# Patient Record
Sex: FEMALE | Race: WHITE | NOT HISPANIC OR LATINO | Employment: OTHER | URBAN - METROPOLITAN AREA
[De-identification: names, ages, dates, MRNs, and addresses within clinical notes are randomized per-mention and may not be internally consistent; named-entity substitution may affect disease eponyms.]

---

## 2017-04-10 ENCOUNTER — APPOINTMENT (OUTPATIENT)
Dept: WOMENS IMAGING | Facility: HOSPITAL | Age: 82
End: 2017-04-10

## 2017-04-10 PROCEDURE — MDREVIEWSP: Performed by: RADIOLOGY

## 2017-04-10 PROCEDURE — G0206 DX MAMMO INCL CAD UNI: HCPCS | Performed by: RADIOLOGY

## 2017-10-05 ENCOUNTER — APPOINTMENT (OUTPATIENT)
Dept: WOMENS IMAGING | Facility: HOSPITAL | Age: 82
End: 2017-10-05

## 2017-10-05 PROCEDURE — G0204 DX MAMMO INCL CAD BI: HCPCS | Performed by: RADIOLOGY

## 2017-10-05 PROCEDURE — G0279 TOMOSYNTHESIS, MAMMO: HCPCS | Performed by: RADIOLOGY

## 2017-10-05 PROCEDURE — 76641 ULTRASOUND BREAST COMPLETE: CPT | Performed by: RADIOLOGY

## 2017-10-17 ENCOUNTER — APPOINTMENT (OUTPATIENT)
Dept: WOMENS IMAGING | Facility: HOSPITAL | Age: 82
End: 2017-10-17

## 2017-10-17 PROCEDURE — 76942 ECHO GUIDE FOR BIOPSY: CPT | Performed by: RADIOLOGY

## 2017-10-17 PROCEDURE — 19083 BX BREAST 1ST LESION US IMAG: CPT | Performed by: RADIOLOGY

## 2017-10-17 PROCEDURE — 19000 PUNCTURE ASPIR CYST BREAST: CPT | Performed by: RADIOLOGY

## 2018-04-27 ENCOUNTER — APPOINTMENT (OUTPATIENT)
Dept: CT IMAGING | Facility: HOSPITAL | Age: 83
End: 2018-04-27

## 2018-04-27 ENCOUNTER — APPOINTMENT (OUTPATIENT)
Dept: GENERAL RADIOLOGY | Facility: HOSPITAL | Age: 83
End: 2018-04-27

## 2018-04-27 ENCOUNTER — HOSPITAL ENCOUNTER (EMERGENCY)
Facility: HOSPITAL | Age: 83
Discharge: HOME | End: 2018-04-27
Attending: FAMILY MEDICINE | Admitting: FAMILY MEDICINE

## 2018-04-27 VITALS
HEIGHT: 70 IN | RESPIRATION RATE: 16 BRPM | OXYGEN SATURATION: 96 % | BODY MASS INDEX: 25.05 KG/M2 | WEIGHT: 175 LBS | HEART RATE: 75 BPM | DIASTOLIC BLOOD PRESSURE: 93 MMHG | TEMPERATURE: 97.4 F | SYSTOLIC BLOOD PRESSURE: 146 MMHG

## 2018-04-27 DIAGNOSIS — R29.6 FREQUENT FALLS: Primary | ICD-10-CM

## 2018-04-27 DIAGNOSIS — S30.0XXA SACRAL CONTUSION, INITIAL ENCOUNTER: ICD-10-CM

## 2018-04-27 DIAGNOSIS — E86.0 MILD DEHYDRATION: ICD-10-CM

## 2018-04-27 DIAGNOSIS — S09.90XA INJURY OF HEAD, INITIAL ENCOUNTER: ICD-10-CM

## 2018-04-27 LAB
ALBUMIN SERPL-MCNC: 3.7 G/DL (ref 3.5–5.2)
ALBUMIN/GLOB SERPL: 1.3 G/DL
ALP SERPL-CCNC: 88 U/L (ref 39–117)
ALT SERPL W P-5'-P-CCNC: 12 U/L (ref 1–33)
ANION GAP SERPL CALCULATED.3IONS-SCNC: 8.6 MMOL/L
AST SERPL-CCNC: 13 U/L (ref 1–32)
BACTERIA UR QL AUTO: ABNORMAL /HPF
BASOPHILS # BLD AUTO: 0.04 10*3/MM3 (ref 0–0.2)
BASOPHILS NFR BLD AUTO: 0.5 % (ref 0–1.5)
BILIRUB SERPL-MCNC: 0.6 MG/DL (ref 0.1–1.2)
BILIRUB UR QL STRIP: NEGATIVE
BUN BLD-MCNC: 19 MG/DL (ref 8–23)
BUN/CREAT SERPL: 15.6 (ref 7–25)
CALCIUM SPEC-SCNC: 9 MG/DL (ref 8.6–10.5)
CHLORIDE SERPL-SCNC: 104 MMOL/L (ref 98–107)
CLARITY UR: CLEAR
CO2 SERPL-SCNC: 28.4 MMOL/L (ref 22–29)
COLOR UR: YELLOW
CREAT BLD-MCNC: 1.22 MG/DL (ref 0.57–1)
DEPRECATED RDW RBC AUTO: 50.2 FL (ref 37–54)
EOSINOPHIL # BLD AUTO: 0.18 10*3/MM3 (ref 0–0.7)
EOSINOPHIL NFR BLD AUTO: 2.3 % (ref 0.3–6.2)
ERYTHROCYTE [DISTWIDTH] IN BLOOD BY AUTOMATED COUNT: 14.2 % (ref 11.7–13)
GFR SERPL CREATININE-BSD FRML MDRD: 42 ML/MIN/1.73
GLOBULIN UR ELPH-MCNC: 2.9 GM/DL
GLUCOSE BLD-MCNC: 96 MG/DL (ref 65–99)
GLUCOSE UR STRIP-MCNC: NEGATIVE MG/DL
HCT VFR BLD AUTO: 45.3 % (ref 35.6–45.5)
HGB BLD-MCNC: 14.8 G/DL (ref 11.9–15.5)
HGB UR QL STRIP.AUTO: ABNORMAL
HOLD SPECIMEN: NORMAL
HOLD SPECIMEN: NORMAL
HYALINE CASTS UR QL AUTO: ABNORMAL /LPF
IMM GRANULOCYTES # BLD: 0.02 10*3/MM3 (ref 0–0.03)
IMM GRANULOCYTES NFR BLD: 0.3 % (ref 0–0.5)
KETONES UR QL STRIP: NEGATIVE
LEUKOCYTE ESTERASE UR QL STRIP.AUTO: NEGATIVE
LYMPHOCYTES # BLD AUTO: 1.32 10*3/MM3 (ref 0.9–4.8)
LYMPHOCYTES NFR BLD AUTO: 16.7 % (ref 19.6–45.3)
MCH RBC QN AUTO: 31.8 PG (ref 26.9–32)
MCHC RBC AUTO-ENTMCNC: 32.7 G/DL (ref 32.4–36.3)
MCV RBC AUTO: 97.2 FL (ref 80.5–98.2)
MONOCYTES # BLD AUTO: 0.78 10*3/MM3 (ref 0.2–1.2)
MONOCYTES NFR BLD AUTO: 9.9 % (ref 5–12)
NEUTROPHILS # BLD AUTO: 5.59 10*3/MM3 (ref 1.9–8.1)
NEUTROPHILS NFR BLD AUTO: 70.6 % (ref 42.7–76)
NITRITE UR QL STRIP: NEGATIVE
NT-PROBNP SERPL-MCNC: 83.7 PG/ML (ref 0–1800)
PH UR STRIP.AUTO: 7 [PH] (ref 5–8)
PLATELET # BLD AUTO: 210 10*3/MM3 (ref 140–500)
PMV BLD AUTO: 12.3 FL (ref 6–12)
POTASSIUM BLD-SCNC: 4.1 MMOL/L (ref 3.5–5.2)
PROT SERPL-MCNC: 6.6 G/DL (ref 6–8.5)
PROT UR QL STRIP: NEGATIVE
RBC # BLD AUTO: 4.66 10*6/MM3 (ref 3.9–5.2)
RBC # UR: ABNORMAL /HPF
REF LAB TEST METHOD: ABNORMAL
SODIUM BLD-SCNC: 141 MMOL/L (ref 136–145)
SP GR UR STRIP: 1.02 (ref 1–1.03)
SQUAMOUS #/AREA URNS HPF: ABNORMAL /HPF
TROPONIN T SERPL-MCNC: <0.01 NG/ML (ref 0–0.03)
UROBILINOGEN UR QL STRIP: ABNORMAL
WBC NRBC COR # BLD: 7.91 10*3/MM3 (ref 4.5–10.7)
WBC UR QL AUTO: ABNORMAL /HPF
WHOLE BLOOD HOLD SPECIMEN: NORMAL
WHOLE BLOOD HOLD SPECIMEN: NORMAL

## 2018-04-27 PROCEDURE — 83880 ASSAY OF NATRIURETIC PEPTIDE: CPT | Performed by: PHYSICIAN ASSISTANT

## 2018-04-27 PROCEDURE — 96360 HYDRATION IV INFUSION INIT: CPT

## 2018-04-27 PROCEDURE — 70450 CT HEAD/BRAIN W/O DYE: CPT

## 2018-04-27 PROCEDURE — 72220 X-RAY EXAM SACRUM TAILBONE: CPT

## 2018-04-27 PROCEDURE — 84484 ASSAY OF TROPONIN QUANT: CPT | Performed by: PHYSICIAN ASSISTANT

## 2018-04-27 PROCEDURE — 93010 ELECTROCARDIOGRAM REPORT: CPT | Performed by: INTERNAL MEDICINE

## 2018-04-27 PROCEDURE — 81001 URINALYSIS AUTO W/SCOPE: CPT | Performed by: PHYSICIAN ASSISTANT

## 2018-04-27 PROCEDURE — 80053 COMPREHEN METABOLIC PANEL: CPT | Performed by: PHYSICIAN ASSISTANT

## 2018-04-27 PROCEDURE — 85025 COMPLETE CBC W/AUTO DIFF WBC: CPT | Performed by: PHYSICIAN ASSISTANT

## 2018-04-27 PROCEDURE — 71045 X-RAY EXAM CHEST 1 VIEW: CPT

## 2018-04-27 PROCEDURE — 99284 EMERGENCY DEPT VISIT MOD MDM: CPT

## 2018-04-27 PROCEDURE — 93005 ELECTROCARDIOGRAM TRACING: CPT | Performed by: PHYSICIAN ASSISTANT

## 2018-04-27 RX ORDER — ATORVASTATIN CALCIUM 10 MG/1
10 TABLET, FILM COATED ORAL DAILY
COMMUNITY

## 2018-04-27 RX ORDER — SODIUM CHLORIDE 0.9 % (FLUSH) 0.9 %
10 SYRINGE (ML) INJECTION AS NEEDED
Status: DISCONTINUED | OUTPATIENT
Start: 2018-04-27 | End: 2018-04-27 | Stop reason: HOSPADM

## 2018-04-27 RX ORDER — FUROSEMIDE 40 MG/1
40 TABLET ORAL DAILY
COMMUNITY

## 2018-04-27 RX ORDER — PAROXETINE HYDROCHLORIDE 20 MG/1
20 TABLET, FILM COATED ORAL EVERY MORNING
COMMUNITY

## 2018-04-27 RX ADMIN — SODIUM CHLORIDE 500 ML: 9 INJECTION, SOLUTION INTRAVENOUS at 18:47

## 2019-02-16 ENCOUNTER — APPOINTMENT (OUTPATIENT)
Dept: CT IMAGING | Facility: HOSPITAL | Age: 84
End: 2019-02-16

## 2019-02-16 ENCOUNTER — APPOINTMENT (OUTPATIENT)
Dept: GENERAL RADIOLOGY | Facility: HOSPITAL | Age: 84
End: 2019-02-16

## 2019-02-16 ENCOUNTER — HOSPITAL ENCOUNTER (EMERGENCY)
Facility: HOSPITAL | Age: 84
Discharge: HOME | End: 2019-02-16
Attending: EMERGENCY MEDICINE | Admitting: EMERGENCY MEDICINE

## 2019-02-16 VITALS
DIASTOLIC BLOOD PRESSURE: 83 MMHG | RESPIRATION RATE: 16 BRPM | BODY MASS INDEX: 26.66 KG/M2 | HEART RATE: 76 BPM | WEIGHT: 180 LBS | TEMPERATURE: 97.6 F | HEIGHT: 69 IN | OXYGEN SATURATION: 97 % | SYSTOLIC BLOOD PRESSURE: 139 MMHG
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
## 2019-02-16 DIAGNOSIS — G89.29 CHRONIC PAIN OF RIGHT KNEE: ICD-10-CM

## 2019-02-16 DIAGNOSIS — S00.03XA CONTUSION OF SCALP, INITIAL ENCOUNTER: Primary | ICD-10-CM

## 2019-02-16 DIAGNOSIS — M25.561 CHRONIC PAIN OF RIGHT KNEE: ICD-10-CM

## 2019-02-16 PROCEDURE — 73562 X-RAY EXAM OF KNEE 3: CPT

## 2019-02-16 PROCEDURE — 70450 CT HEAD/BRAIN W/O DYE: CPT

## 2019-02-16 PROCEDURE — 99283 EMERGENCY DEPT VISIT LOW MDM: CPT

## 2019-02-16 NOTE — ED PROVIDER NOTES
EMERGENCY DEPARTMENT ENCOUNTER    CHIEF COMPLAINT  Chief Complaint: Fall  History given by: Patient, EMS  History limited by: none   Room Number:   PMD: Atiya Miller MD      HPI:  Pt is a 89 y.o. female who presents to the ED via EMS complaining of unwitnessed mechanical fall onto buttocks that occurred at NH this morning. Per EMS, pt fell backwards and hit occiput as well, and pt confirms mild scalp pain. Pt confirms R knee pain due to hx of arthritis, but no other pain from fall. Pt denies LOC upon event, neck pain, HA, back pain, or any anticoagulation.     Duration:  Unspecified, PTA   Onset: sudden   Timin time event   Location: occiput   Radiation: none   Quality: pain  Intensity/Severity: mild   Progression: unchanged   Associated Symptoms: R knee pain   Aggravating Factors: fall   Alleviating Factors: none   Previous Episodes: none   Treatment before arrival: none    PAST MEDICAL HISTORY  Active Ambulatory Problems     Diagnosis Date Noted   • No Active Ambulatory Problems     Resolved Ambulatory Problems     Diagnosis Date Noted   • No Resolved Ambulatory Problems     Past Medical History:   Diagnosis Date   • Hyperlipidemia        PAST SURGICAL HISTORY  Past Surgical History:   Procedure Laterality Date   • APPENDECTOMY     • TONSILLECTOMY         FAMILY HISTORY  History reviewed. No pertinent family history.    SOCIAL HISTORY  Social History     Socioeconomic History   • Marital status: Single     Spouse name: Not on file   • Number of children: Not on file   • Years of education: Not on file   • Highest education level: Not on file   Social Needs   • Financial resource strain: Not on file   • Food insecurity - worry: Not on file   • Food insecurity - inability: Not on file   • Transportation needs - medical: Not on file   • Transportation needs - non-medical: Not on file   Occupational History   • Not on file   Tobacco Use   • Smoking status: Former Smoker   Substance and Sexual  Activity   • Alcohol use: Yes     Comment: occ   • Drug use: Not on file   • Sexual activity: Not on file   Other Topics Concern   • Not on file   Social History Narrative   • Not on file       ALLERGIES  Patient has no known allergies.    REVIEW OF SYSTEMS  Review of Systems   Constitutional: Negative for fever.   HENT: Negative for sore throat.    Eyes: Negative.    Respiratory: Negative for cough and shortness of breath.    Cardiovascular: Negative for chest pain.   Gastrointestinal: Negative for abdominal pain, diarrhea and vomiting.   Genitourinary: Negative for dysuria.   Musculoskeletal: Positive for arthralgias (R knee, scalp). Negative for back pain and neck pain.   Skin: Negative for rash.   Neurological: Negative for weakness, numbness and headaches.   Hematological: Negative.    Psychiatric/Behavioral: Negative.    All other systems reviewed and are negative.      PHYSICAL EXAM  ED Triage Vitals [02/16/19 1032]   Temp Heart Rate Resp BP SpO2   97.6 °F (36.4 °C) 76 16 154/62 97 %      Temp src Heart Rate Source Patient Position BP Location FiO2 (%)   Oral Monitor Sitting -- --       Physical Exam   Constitutional: She is oriented to person, place, and time and well-developed, well-nourished, and in no distress. No distress.   HENT:   Head: Normocephalic and atraumatic. Head is without contusion and without laceration.   Mouth/Throat: Mucous membranes are normal.   Eyes: EOM are normal.   Neck: Full passive range of motion without pain. No muscular tenderness present.   Cardiovascular: Normal rate and regular rhythm. Exam reveals no gallop and no friction rub.   No murmur heard.  Pulmonary/Chest: Effort normal. No respiratory distress. She has no wheezes. She has no rhonchi. She has no rales.   Breath sounds are symmetric.   Abdominal: Soft. She exhibits no distension. There is no tenderness. There is no guarding.   Musculoskeletal: Normal range of motion. She exhibits no deformity.        Right knee: She  exhibits normal range of motion, no effusion, no ecchymosis and no laceration.        Cervical back: She exhibits no tenderness and no bony tenderness.        Thoracic back: She exhibits no tenderness and no bony tenderness.        Lumbar back: She exhibits no tenderness and no bony tenderness.   Neurological: She is alert and oriented to person, place, and time.   moving all extremities, no focal deficits   Skin: Skin is warm and dry.   Psychiatric:   Calm, cooperative         RADIOLOGY  CT Head Without Contrast   Final Result           No acute intracranial hemorrhage or hydrocephalus. Chronic changes   of the brain. If there is further clinical concern, MRI could be   considered for further evaluation.       This report was finalized on 2/16/2019 11:32 AM by Dr. Ashwin Lopez M.D.          XR Knee 3 View Right   Final Result       No acute fracture is identified. Degenerative changes. Follow-up/further   evaluation can be obtained as indicated.               This report was finalized on 2/16/2019 11:14 AM by Dr. Ashwin Lopez M.D.               I ordered the above noted radiological studies. Interpreted by radiologist. Reviewed by me in PACS.     Procedures      PROGRESS AND CONSULTS     1035: CT head ordered due to hitting head upon fall    1040: XR R knee ordered for further evaluation.     1141: Pt rechecked and resting comfortably. Discussed negative acute findings of CT head with evidence of degenerative changes on XR knee. Informed pt of plan to discharge to NH facility, and pt directed to discuss knee pain with PCP. Pt understands and agrees with plan, all questions addressed.      MEDICAL DECISION MAKING  Results were reviewed/discussed with the patient and they were also made aware of online access. Pt also made aware that some labs, such as cultures, will not be resulted during ER visit and follow up with PMD is necessary.     MDM  Number of Diagnoses or Management Options  Chronic pain of  right knee:   Contusion of scalp, initial encounter:   Fall, initial encounter:   Diagnosis management comments: Patient without acute findings on imaging. Follow up with PCP and return to the ED for worsening symptoms as needed.        Amount and/or Complexity of Data Reviewed  Tests in the radiology section of CPT®: ordered and reviewed (CT head - no acute intracranial hemorrhage  XR - degenerative changes )           DIAGNOSIS  Final diagnoses:   Contusion of scalp, initial encounter   Chronic pain of right knee   Fall, initial encounter       DISPOSITION  DISCHARGE    Patient discharged in stable condition.    Reviewed implications of results, diagnosis, meds, responsibility to follow up, warning signs and symptoms of possible worsening, potential complications and reasons to return to ER.    Patient/Family voiced understanding of above instructions.    Discussed plan for discharge, as there is no emergent indication for admission. Patient referred to primary care provider for BP management due to today's BP. Pt/family is agreeable and understands need for follow up and repeat testing.  Pt is aware that discharge does not mean that nothing is wrong but it indicates no emergency is present that requires admission and they must continue care with follow-up as given below or physician of their choice.     FOLLOW-UP  Atiya Miller MD  [address] [place] [address]  [address]  [place] [address]  [phone]    Schedule an appointment as soon as possible for a visit       [facility] Emergency Department  [address] [facility] [address] [phone]    As needed, If symptoms worsen         Medication List      No changes were made to your prescriptions during this visit.           Latest Documented Vital Signs:  As of 11:44 AM  BP- 154/62 HR- 76 Temp- 97.6 °F (36.4 °C) (Oral) O2 sat- 97%    --  Documentation assistance provided by zachary Dale for Dr. Tan.  Information  recorded by the scribe was done at my direction and has been verified and validated by me.            Tuyet Dale  02/16/19 1144       Jamil Tan MD  02/16/19 6581

## 2019-02-16 NOTE — ED NOTES
"Spoke with son Lee at 1208, instructed him that his mother was going to be discharged.  Son verbalized understanding and said he would be by hospital to get his mother to take her back to [place].  Patient is alert and oriented x4, went over discharge, for which patient verbalized understanding.  Patient asked if it was okay to take her out to waiting room.  Patient states, \"as long as I can watch the game until my son comes to get me.\"  Patient escorted out in wheelchair and placed in front of TV, with view of ER entrance.  Triage staff instructed that patient would need help out when son arrived, verbalized understanding.     Matt Nickerson RN  02/16/19 1219    "

## 2019-02-22 ENCOUNTER — APPOINTMENT (OUTPATIENT)
Dept: CT IMAGING | Facility: HOSPITAL | Age: 84
End: 2019-02-22

## 2019-02-22 ENCOUNTER — HOSPITAL ENCOUNTER (EMERGENCY)
Facility: HOSPITAL | Age: 84
Discharge: HOME | End: 2019-02-23
Attending: EMERGENCY MEDICINE | Admitting: EMERGENCY MEDICINE

## 2019-02-22 VITALS
HEIGHT: 69 IN | DIASTOLIC BLOOD PRESSURE: 68 MMHG | TEMPERATURE: 98.3 F | HEART RATE: 70 BPM | OXYGEN SATURATION: 99 % | SYSTOLIC BLOOD PRESSURE: 132 MMHG | RESPIRATION RATE: 16 BRPM | BODY MASS INDEX: 28.24 KG/M2 | WEIGHT: 190.7 LBS
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
## 2019-02-22 DIAGNOSIS — S09.90XA MINOR HEAD INJURY WITHOUT LOSS OF CONSCIOUSNESS, INITIAL ENCOUNTER: Primary | ICD-10-CM

## 2019-02-22 PROCEDURE — 99284 EMERGENCY DEPT VISIT MOD MDM: CPT

## 2019-02-22 PROCEDURE — 70450 CT HEAD/BRAIN W/O DYE: CPT

## 2019-02-23 ENCOUNTER — HOSPITAL ENCOUNTER (EMERGENCY)
Facility: HOSPITAL | Age: 84
Discharge: STILL ADMITTED OR ADMINISTRATIVE | End: 2019-02-23

## 2019-02-23 NOTE — ED PROVIDER NOTES
Pt is a 89 y.o. female who presents to the ED complaining of a posterior scalp hematoma that she sustained after a fall this evening.       On exam,  Constitutional: NAD  HENT: There is a R occipital scalp hematoma  Musculoskeletal: No C-spine tenderness      Imaging reviewed.     Plan: Discharge      MD ATTESTATION NOTE    The GENOVEVA and I have discussed this patient's history, physical exam, and treatment plan.  I have reviewed the documentation and personally had a face to face interaction with the patient. I affirm the documentation and agree with the treatment and plan.  The attached note describes my personal findings.      Documentation assistance provided by zachary Frost for Dr. Levy. Information recorded by the scribe was done at my direction and has been verified and validated by me.             Bret Frost  02/22/19 7912       Jimbo Levy MD  02/22/19 9274

## 2019-02-23 NOTE — ED PROVIDER NOTES
" EMERGENCY DEPARTMENT ENCOUNTER    CHIEF COMPLAINT  Chief Complaint: head injury  History given by: patient  History limited by: nothing  Room Number: 28/28  PMD: Atiya Miller MD      HPI:  Pt is a 89 y.o. female who presents complaining of posterior head injury s/p fall backwards just PTA. Pt states that she lost her balance and fell backwards. Pt denies LOC, HA, neck pain, back pain, N/V or dizziness. Pt states that \"the only reason that I'm here is because my doctor told me that I needed to get checked any time that I hit my head.\" Pt states that she has a history of frequent falls    Duration:  PTA  Onset: sudden  Timing: constant  Location: posterior head  Radiation: none  Quality: unspecified  Intensity/Severity: moderate  Progression: unchanged  Associated Symptoms: none  Aggravating Factors: none  Alleviating Factors: none  Previous Episodes: Pt states that she has a history of frequent falls  Treatment before arrival: none    PAST MEDICAL HISTORY  Active Ambulatory Problems     Diagnosis Date Noted   • No Active Ambulatory Problems     Resolved Ambulatory Problems     Diagnosis Date Noted   • No Resolved Ambulatory Problems     Past Medical History:   Diagnosis Date   • Hyperlipidemia        PAST SURGICAL HISTORY  Past Surgical History:   Procedure Laterality Date   • APPENDECTOMY     • TONSILLECTOMY         FAMILY HISTORY  No family history on file.    SOCIAL HISTORY  Social History     Socioeconomic History   • Marital status: Single     Spouse name: Not on file   • Number of children: Not on file   • Years of education: Not on file   • Highest education level: Not on file   Social Needs   • Financial resource strain: Not on file   • Food insecurity - worry: Not on file   • Food insecurity - inability: Not on file   • Transportation needs - medical: Not on file   • Transportation needs - non-medical: Not on file   Occupational History   • Not on file   Tobacco Use   • Smoking status: Former " Smoker   Substance and Sexual Activity   • Alcohol use: Yes     Comment: occ   • Drug use: Not on file   • Sexual activity: Not on file   Other Topics Concern   • Not on file   Social History Narrative   • Not on file       ALLERGIES  Patient has no known allergies.    REVIEW OF SYSTEMS  Review of Systems   Constitutional: Negative for fever.   HENT: Negative for sore throat.    Eyes: Negative.    Respiratory: Negative for cough and shortness of breath.    Cardiovascular: Negative for chest pain.   Gastrointestinal: Negative for abdominal pain, diarrhea and vomiting.   Genitourinary: Negative for dysuria.   Musculoskeletal: Negative for neck pain.   Skin: Negative for rash.        (+) Scalp contusion   Neurological: Negative for weakness, numbness and headaches.   Hematological: Negative.    Psychiatric/Behavioral: Negative.    All other systems reviewed and are negative.      PHYSICAL EXAM  ED Triage Vitals   Temp Heart Rate Resp BP SpO2   02/22/19 2235 02/22/19 2233 02/22/19 2233 02/22/19 2233 02/22/19 2233   98.3 °F (36.8 °C) 75 14 137/75 96 %      Temp src Heart Rate Source Patient Position BP Location FiO2 (%)   -- -- 02/22/19 2246 02/22/19 2246 --     Lying Right arm        Physical Exam   Constitutional: She is oriented to person, place, and time. No distress.   HENT:   Head: Normocephalic.   Small right occipital hematoma   Eyes: EOM are normal. Pupils are equal, round, and reactive to light.   Neck: Normal range of motion. Neck supple.   Cardiovascular: Normal rate, regular rhythm and normal heart sounds.   Pulmonary/Chest: Effort normal and breath sounds normal. No respiratory distress.   Abdominal: Soft. There is no tenderness. There is no rebound and no guarding.   Musculoskeletal: Normal range of motion. She exhibits edema (2+ pitting edema in BLE).   No c spine tenderness or back tendereness   Neurological: She is alert and oriented to person, place, and time. She has normal sensation and normal  strength.   Skin: Skin is warm and dry. No rash noted.   Psychiatric: Mood and affect normal.   Nursing note and vitals reviewed.    RADIOLOGY  CT Head Without Contrast   Final Result   1. No acute intracranial abnormality.                           This report was finalized on 2/22/2019 11:17 PM by Hung Wilder M.D.               I ordered the above noted radiological studies. Interpreted by radiologist. Reviewed by me in PACS.         PROGRESS AND CONSULTS     2250 Ordered CT Head for further evaluation.    2308 Discussed patient care with Dr. Levy who agrees with plan of care.     2339 Rechecked patient. Pt is resting comfortably and family is at bedside. Informed the patient of the imaging studies that show nothing acute and the plan for discharge. RTER warnings given for dizziness, N/V, LOC or any concerns. Pt understands and agrees with the plan, all questions answered.      MEDICAL DECISION MAKING  Results were reviewed/discussed with the patient and they were also made aware of online access. Pt also made aware that some labs, such as cultures, will not be resulted during ER visit and follow up with PMD is necessary.     MDM  Number of Diagnoses or Management Options  Fall, initial encounter:   Minor head injury without loss of consciousness, initial encounter:      Amount and/or Complexity of Data Reviewed  Tests in the radiology section of CPT®: reviewed and ordered (CT Head- shows nothing acute.)  Decide to obtain previous medical records or to obtain history from someone other than the patient: yes  Review and summarize past medical records: yes (Pt was last seen in the ED on 2/16/19 for a scalp contusion s/p fall.)  Discuss the patient with other providers: yes (Dr. Levy)  Independent visualization of images, tracings, or specimens: yes    Patient Progress  Patient progress: stable         DIAGNOSIS  Final diagnoses:   Minor head injury without loss of consciousness, initial encounter   Fall,  initial encounter       DISPOSITION  DISCHARGE    Patient discharged in stable condition.    Reviewed implications of results, diagnosis, meds, responsibility to follow up, warning signs and symptoms of possible worsening, potential complications and reasons to return to ER.    Patient/Family voiced understanding of above instructions.    Discussed plan for discharge, as there is no emergent indication for admission. Patient referred to primary care provider for BP management due to today's BP. Pt/family is agreeable and understands need for follow up and repeat testing.  Pt is aware that discharge does not mean that nothing is wrong but it indicates no emergency is present that requires admission and they must continue care with follow-up as given below or physician of their choice.     FOLLOW-UP  Atiya Miller MD  [address] [place] [address]  [address]  [place] [address]  [phone]    Schedule an appointment as soon as possible for a visit in 3 days           Medication List      No changes were made to your prescriptions during this visit.           Latest Documented Vital Signs:  As of 12:53 AM  BP- 132/68 HR- 70 Temp- 98.3 °F (36.8 °C) O2 sat- 99%    --  Documentation assistance provided by iesha Cornejo and Aakash Sultana for Aakash Yang.  Information recorded by the zachary was done at my direction and has been verified and validated by me.     Aakash Sultana  02/22/19 2334       Cecy Cornejo  02/23/19 9366       Aakash Yang PA  02/23/19 2439

## 2019-02-23 NOTE — DISCHARGE INSTRUCTIONS
Follow head injury precautions, tylenol as needed, home medicine as prescribed, follow up with PCP for recheck. Return to care with further concerns.

## 2019-02-23 NOTE — ED NOTES
"Pt to room 28 with c/o fall today, hit back of head, denies LOC, no bleeding or lac noted, not on blood thinners. Pt states she lost her balance and fell. \"Im only here because my doctor told me to come.\"     Vita Lubin RN  02/22/19 3179       Vita Lubin RN  02/22/19 6655    "

## 2019-07-12 ENCOUNTER — LAB REQUISITION (OUTPATIENT)
Dept: LAB | Facility: HOSPITAL | Age: 84
End: 2019-07-12

## 2019-07-12 DIAGNOSIS — Z00.00 ROUTINE GENERAL MEDICAL EXAMINATION AT A HEALTH CARE FACILITY: ICD-10-CM

## 2019-07-12 LAB
BACTERIA UR QL AUTO: ABNORMAL /HPF
BILIRUB UR QL STRIP: NEGATIVE
CLARITY UR: CLEAR
COLOR UR: YELLOW
GLUCOSE UR STRIP-MCNC: NEGATIVE MG/DL
HGB UR QL STRIP.AUTO: ABNORMAL
HYALINE CASTS UR QL AUTO: ABNORMAL /LPF
KETONES UR QL STRIP: NEGATIVE
LEUKOCYTE ESTERASE UR QL STRIP.AUTO: NEGATIVE
NITRITE UR QL STRIP: NEGATIVE
PH UR STRIP.AUTO: 6 [PH] (ref 5–8)
PROT UR QL STRIP: NEGATIVE
RBC # UR: ABNORMAL /HPF
REF LAB TEST METHOD: ABNORMAL
SP GR UR STRIP: 1.02 (ref 1–1.03)
SQUAMOUS #/AREA URNS HPF: ABNORMAL /HPF
UROBILINOGEN UR QL STRIP: ABNORMAL
WBC UR QL AUTO: ABNORMAL /HPF

## 2019-07-12 PROCEDURE — 81001 URINALYSIS AUTO W/SCOPE: CPT

## 2020-01-09 ENCOUNTER — APPOINTMENT (OUTPATIENT)
Dept: GENERAL RADIOLOGY | Facility: HOSPITAL | Age: 85
End: 2020-01-09

## 2020-01-09 ENCOUNTER — APPOINTMENT (OUTPATIENT)
Dept: MRI IMAGING | Facility: HOSPITAL | Age: 85
End: 2020-01-09

## 2020-01-09 ENCOUNTER — APPOINTMENT (OUTPATIENT)
Dept: CT IMAGING | Facility: HOSPITAL | Age: 85
End: 2020-01-09

## 2020-01-09 ENCOUNTER — HOSPITAL ENCOUNTER (INPATIENT)
Facility: HOSPITAL | Age: 85
LOS: 2 days | Discharge: HOME | End: 2020-01-12
Attending: EMERGENCY MEDICINE | Admitting: INTERNAL MEDICINE

## 2020-01-09 DIAGNOSIS — N39.0 URINARY TRACT INFECTION IN FEMALE: ICD-10-CM

## 2020-01-09 DIAGNOSIS — I10 HYPERTENSION, UNSPECIFIED TYPE: ICD-10-CM

## 2020-01-09 DIAGNOSIS — R29.898 WEAKNESS OF RIGHT UPPER EXTREMITY: Primary | ICD-10-CM

## 2020-01-09 PROBLEM — E78.5 HYPERLIPIDEMIA: Chronic | Status: ACTIVE | Noted: 2020-01-09

## 2020-01-09 PROBLEM — I63.9 ACUTE ISCHEMIC STROKE (HCC): Status: ACTIVE | Noted: 2020-01-09

## 2020-01-09 LAB
ALBUMIN SERPL-MCNC: 3.4 G/DL (ref 3.5–5.2)
ALBUMIN/GLOB SERPL: 1.3 G/DL
ALP SERPL-CCNC: 86 U/L (ref 39–117)
ALT SERPL W P-5'-P-CCNC: 10 U/L (ref 1–33)
ANION GAP SERPL CALCULATED.3IONS-SCNC: 9.7 MMOL/L (ref 5–15)
AST SERPL-CCNC: 13 U/L (ref 1–32)
BACTERIA UR QL AUTO: ABNORMAL /HPF
BASOPHILS # BLD AUTO: 0.06 10*3/MM3 (ref 0–0.2)
BASOPHILS NFR BLD AUTO: 0.8 % (ref 0–1.5)
BILIRUB SERPL-MCNC: 0.5 MG/DL (ref 0.2–1.2)
BILIRUB UR QL STRIP: NEGATIVE
BUN BLD-MCNC: 14 MG/DL (ref 8–23)
BUN/CREAT SERPL: 23 (ref 7–25)
CALCIUM SPEC-SCNC: 8.1 MG/DL (ref 8.2–9.6)
CHLORIDE SERPL-SCNC: 106 MMOL/L (ref 98–107)
CLARITY UR: ABNORMAL
CO2 SERPL-SCNC: 23.3 MMOL/L (ref 22–29)
COLOR UR: YELLOW
CREAT BLD-MCNC: 0.61 MG/DL (ref 0.57–1)
DEPRECATED RDW RBC AUTO: 41.8 FL (ref 37–54)
EOSINOPHIL # BLD AUTO: 0.73 10*3/MM3 (ref 0–0.4)
EOSINOPHIL NFR BLD AUTO: 9.3 % (ref 0.3–6.2)
ERYTHROCYTE [DISTWIDTH] IN BLOOD BY AUTOMATED COUNT: 12.2 % (ref 12.3–15.4)
GFR SERPL CREATININE-BSD FRML MDRD: 92 ML/MIN/1.73
GLOBULIN UR ELPH-MCNC: 2.7 GM/DL
GLUCOSE BLD-MCNC: 115 MG/DL (ref 65–99)
GLUCOSE UR STRIP-MCNC: NEGATIVE MG/DL
HCT VFR BLD AUTO: 46.4 % (ref 34–46.6)
HGB BLD-MCNC: 15.4 G/DL (ref 12–15.9)
HGB UR QL STRIP.AUTO: ABNORMAL
HOLD SPECIMEN: NORMAL
HOLD SPECIMEN: NORMAL
HYALINE CASTS UR QL AUTO: ABNORMAL /LPF
IMM GRANULOCYTES # BLD AUTO: 0.02 10*3/MM3 (ref 0–0.05)
IMM GRANULOCYTES NFR BLD AUTO: 0.3 % (ref 0–0.5)
KETONES UR QL STRIP: NEGATIVE
LEUKOCYTE ESTERASE UR QL STRIP.AUTO: ABNORMAL
LYMPHOCYTES # BLD AUTO: 0.87 10*3/MM3 (ref 0.7–3.1)
LYMPHOCYTES NFR BLD AUTO: 11 % (ref 19.6–45.3)
MAGNESIUM SERPL-MCNC: 1.8 MG/DL (ref 1.6–2.4)
MCH RBC QN AUTO: 30.9 PG (ref 26.6–33)
MCHC RBC AUTO-ENTMCNC: 33.2 G/DL (ref 31.5–35.7)
MCV RBC AUTO: 93 FL (ref 79–97)
MONOCYTES # BLD AUTO: 0.65 10*3/MM3 (ref 0.1–0.9)
MONOCYTES NFR BLD AUTO: 8.2 % (ref 5–12)
NEUTROPHILS # BLD AUTO: 5.55 10*3/MM3 (ref 1.7–7)
NEUTROPHILS NFR BLD AUTO: 70.4 % (ref 42.7–76)
NITRITE UR QL STRIP: NEGATIVE
NRBC BLD AUTO-RTO: 0 /100 WBC (ref 0–0.2)
PH UR STRIP.AUTO: 7 [PH] (ref 5–8)
PLATELET # BLD AUTO: 212 10*3/MM3 (ref 140–450)
PMV BLD AUTO: 11.1 FL (ref 6–12)
POTASSIUM BLD-SCNC: 3.7 MMOL/L (ref 3.5–5.2)
PROT SERPL-MCNC: 6.1 G/DL (ref 6–8.5)
PROT UR QL STRIP: NEGATIVE
RBC # BLD AUTO: 4.99 10*6/MM3 (ref 3.77–5.28)
RBC # UR: ABNORMAL /HPF
REF LAB TEST METHOD: ABNORMAL
SODIUM BLD-SCNC: 139 MMOL/L (ref 136–145)
SP GR UR STRIP: 1.02 (ref 1–1.03)
SQUAMOUS #/AREA URNS HPF: ABNORMAL /HPF
TROPONIN T SERPL-MCNC: <0.01 NG/ML (ref 0–0.03)
UROBILINOGEN UR QL STRIP: ABNORMAL
WBC NRBC COR # BLD: 7.88 10*3/MM3 (ref 3.4–10.8)
WBC UR QL AUTO: ABNORMAL /HPF
WHOLE BLOOD HOLD SPECIMEN: NORMAL
WHOLE BLOOD HOLD SPECIMEN: NORMAL

## 2020-01-09 PROCEDURE — 71045 X-RAY EXAM CHEST 1 VIEW: CPT

## 2020-01-09 PROCEDURE — 81001 URINALYSIS AUTO W/SCOPE: CPT | Performed by: EMERGENCY MEDICINE

## 2020-01-09 PROCEDURE — 85025 COMPLETE CBC W/AUTO DIFF WBC: CPT | Performed by: EMERGENCY MEDICINE

## 2020-01-09 PROCEDURE — 87086 URINE CULTURE/COLONY COUNT: CPT | Performed by: INTERNAL MEDICINE

## 2020-01-09 PROCEDURE — 99285 EMERGENCY DEPT VISIT HI MDM: CPT

## 2020-01-09 PROCEDURE — 80053 COMPREHEN METABOLIC PANEL: CPT | Performed by: EMERGENCY MEDICINE

## 2020-01-09 PROCEDURE — G0378 HOSPITAL OBSERVATION PER HR: HCPCS

## 2020-01-09 PROCEDURE — 70551 MRI BRAIN STEM W/O DYE: CPT

## 2020-01-09 PROCEDURE — 87186 SC STD MICRODIL/AGAR DIL: CPT | Performed by: INTERNAL MEDICINE

## 2020-01-09 PROCEDURE — 93010 ELECTROCARDIOGRAM REPORT: CPT | Performed by: INTERNAL MEDICINE

## 2020-01-09 PROCEDURE — 83735 ASSAY OF MAGNESIUM: CPT | Performed by: EMERGENCY MEDICINE

## 2020-01-09 PROCEDURE — 25010000002 CEFTRIAXONE PER 250 MG: Performed by: EMERGENCY MEDICINE

## 2020-01-09 PROCEDURE — 87088 URINE BACTERIA CULTURE: CPT | Performed by: INTERNAL MEDICINE

## 2020-01-09 PROCEDURE — 70450 CT HEAD/BRAIN W/O DYE: CPT

## 2020-01-09 PROCEDURE — 84484 ASSAY OF TROPONIN QUANT: CPT | Performed by: EMERGENCY MEDICINE

## 2020-01-09 PROCEDURE — 93005 ELECTROCARDIOGRAM TRACING: CPT | Performed by: EMERGENCY MEDICINE

## 2020-01-09 PROCEDURE — 93005 ELECTROCARDIOGRAM TRACING: CPT

## 2020-01-09 RX ORDER — MORPHINE SULFATE 2 MG/ML
1 INJECTION, SOLUTION INTRAMUSCULAR; INTRAVENOUS EVERY 4 HOURS PRN
Status: DISCONTINUED | OUTPATIENT
Start: 2020-01-09 | End: 2020-01-12 | Stop reason: HOSPADM

## 2020-01-09 RX ORDER — BISACODYL 10 MG
10 SUPPOSITORY, RECTAL RECTAL DAILY PRN
Status: DISCONTINUED | OUTPATIENT
Start: 2020-01-09 | End: 2020-01-12 | Stop reason: HOSPADM

## 2020-01-09 RX ORDER — DOCUSATE SODIUM 100 MG/1
100 CAPSULE, LIQUID FILLED ORAL 2 TIMES DAILY PRN
Status: DISCONTINUED | OUTPATIENT
Start: 2020-01-09 | End: 2020-01-10 | Stop reason: SDUPTHER

## 2020-01-09 RX ORDER — ONDANSETRON 4 MG/1
4 TABLET, FILM COATED ORAL EVERY 6 HOURS PRN
Status: DISCONTINUED | OUTPATIENT
Start: 2020-01-09 | End: 2020-01-12 | Stop reason: HOSPADM

## 2020-01-09 RX ORDER — SODIUM CHLORIDE 0.9 % (FLUSH) 0.9 %
10 SYRINGE (ML) INJECTION AS NEEDED
Status: DISCONTINUED | OUTPATIENT
Start: 2020-01-09 | End: 2020-01-12 | Stop reason: HOSPADM

## 2020-01-09 RX ORDER — NALOXONE HCL 0.4 MG/ML
0.4 VIAL (ML) INJECTION
Status: DISCONTINUED | OUTPATIENT
Start: 2020-01-09 | End: 2020-01-12 | Stop reason: HOSPADM

## 2020-01-09 RX ORDER — NITROFURANTOIN 25; 75 MG/1; MG/1
100 CAPSULE ORAL NIGHTLY
COMMUNITY

## 2020-01-09 RX ORDER — SODIUM CHLORIDE 0.9 % (FLUSH) 0.9 %
10 SYRINGE (ML) INJECTION EVERY 12 HOURS SCHEDULED
Status: DISCONTINUED | OUTPATIENT
Start: 2020-01-09 | End: 2020-01-12 | Stop reason: HOSPADM

## 2020-01-09 RX ORDER — ONDANSETRON 2 MG/ML
4 INJECTION INTRAMUSCULAR; INTRAVENOUS EVERY 6 HOURS PRN
Status: DISCONTINUED | OUTPATIENT
Start: 2020-01-09 | End: 2020-01-10 | Stop reason: SDUPTHER

## 2020-01-09 RX ORDER — CEFTRIAXONE SODIUM 1 G/50ML
1 INJECTION, SOLUTION INTRAVENOUS EVERY 24 HOURS
Status: DISCONTINUED | OUTPATIENT
Start: 2020-01-10 | End: 2020-01-10

## 2020-01-09 RX ORDER — CALCIUM CARBONATE 200(500)MG
2 TABLET,CHEWABLE ORAL 2 TIMES DAILY PRN
Status: DISCONTINUED | OUTPATIENT
Start: 2020-01-09 | End: 2020-01-12 | Stop reason: HOSPADM

## 2020-01-09 RX ORDER — CETIRIZINE HYDROCHLORIDE 10 MG/1
5 TABLET ORAL DAILY
Status: DISCONTINUED | OUTPATIENT
Start: 2020-01-10 | End: 2020-01-12 | Stop reason: HOSPADM

## 2020-01-09 RX ORDER — UREA 10 %
1 LOTION (ML) TOPICAL NIGHTLY PRN
Status: DISCONTINUED | OUTPATIENT
Start: 2020-01-09 | End: 2020-01-12 | Stop reason: HOSPADM

## 2020-01-09 RX ORDER — ATORVASTATIN CALCIUM 80 MG/1
80 TABLET, FILM COATED ORAL DAILY
Status: DISCONTINUED | OUTPATIENT
Start: 2020-01-09 | End: 2020-01-12 | Stop reason: HOSPADM

## 2020-01-09 RX ORDER — NITROGLYCERIN 0.4 MG/1
0.4 TABLET SUBLINGUAL
Status: DISCONTINUED | OUTPATIENT
Start: 2020-01-09 | End: 2020-01-12 | Stop reason: HOSPADM

## 2020-01-09 RX ORDER — ACETAMINOPHEN 500 MG
500 TABLET ORAL EVERY 6 HOURS PRN
COMMUNITY

## 2020-01-09 RX ORDER — ACETAMINOPHEN 325 MG/1
650 TABLET ORAL EVERY 4 HOURS PRN
Status: DISCONTINUED | OUTPATIENT
Start: 2020-01-09 | End: 2020-01-12 | Stop reason: HOSPADM

## 2020-01-09 RX ORDER — PAROXETINE HYDROCHLORIDE 20 MG/1
20 TABLET, FILM COATED ORAL EVERY MORNING
Status: DISCONTINUED | OUTPATIENT
Start: 2020-01-10 | End: 2020-01-12 | Stop reason: HOSPADM

## 2020-01-09 RX ORDER — NAPROXEN SODIUM 220 MG
220 TABLET ORAL EVERY 12 HOURS PRN
COMMUNITY
End: 2020-01-12 | Stop reason: HOSPADM

## 2020-01-09 RX ORDER — DOCUSATE SODIUM 100 MG/1
100 CAPSULE, LIQUID FILLED ORAL 2 TIMES DAILY PRN
Status: DISCONTINUED | OUTPATIENT
Start: 2020-01-09 | End: 2020-01-12 | Stop reason: HOSPADM

## 2020-01-09 RX ORDER — SODIUM CHLORIDE AND POTASSIUM CHLORIDE 150; 900 MG/100ML; MG/100ML
75 INJECTION, SOLUTION INTRAVENOUS CONTINUOUS
Status: DISCONTINUED | OUTPATIENT
Start: 2020-01-09 | End: 2020-01-11

## 2020-01-09 RX ORDER — ASPIRIN 325 MG
325 TABLET ORAL DAILY
Status: DISCONTINUED | OUTPATIENT
Start: 2020-01-10 | End: 2020-01-12 | Stop reason: HOSPADM

## 2020-01-09 RX ORDER — ACETAMINOPHEN 650 MG/1
650 SUPPOSITORY RECTAL EVERY 4 HOURS PRN
Status: DISCONTINUED | OUTPATIENT
Start: 2020-01-09 | End: 2020-01-12 | Stop reason: HOSPADM

## 2020-01-09 RX ORDER — ASPIRIN 300 MG/1
300 SUPPOSITORY RECTAL DAILY
Status: DISCONTINUED | OUTPATIENT
Start: 2020-01-10 | End: 2020-01-12 | Stop reason: HOSPADM

## 2020-01-09 RX ORDER — LORATADINE 10 MG/1
10 TABLET ORAL DAILY
COMMUNITY

## 2020-01-09 RX ORDER — ONDANSETRON 2 MG/ML
4 INJECTION INTRAMUSCULAR; INTRAVENOUS EVERY 6 HOURS PRN
Status: DISCONTINUED | OUTPATIENT
Start: 2020-01-09 | End: 2020-01-12 | Stop reason: HOSPADM

## 2020-01-09 RX ORDER — ACETAMINOPHEN 160 MG/5ML
650 SOLUTION ORAL EVERY 4 HOURS PRN
Status: DISCONTINUED | OUTPATIENT
Start: 2020-01-09 | End: 2020-01-12 | Stop reason: HOSPADM

## 2020-01-09 RX ORDER — ASPIRIN 325 MG
325 TABLET ORAL ONCE
Status: COMPLETED | OUTPATIENT
Start: 2020-01-09 | End: 2020-01-09

## 2020-01-09 RX ORDER — GUAIFENESIN 600 MG/1
1 TABLET, EXTENDED RELEASE ORAL 2 TIMES DAILY PRN
COMMUNITY

## 2020-01-09 RX ORDER — CEFTRIAXONE SODIUM 1 G/50ML
1 INJECTION, SOLUTION INTRAVENOUS ONCE
Status: COMPLETED | OUTPATIENT
Start: 2020-01-09 | End: 2020-01-09

## 2020-01-09 RX ADMIN — CEFTRIAXONE SODIUM 1 G: 1 INJECTION, SOLUTION INTRAVENOUS at 15:40

## 2020-01-09 RX ADMIN — ATORVASTATIN CALCIUM 80 MG: 80 TABLET, FILM COATED ORAL at 20:40

## 2020-01-09 RX ADMIN — ASPIRIN 325 MG: 325 TABLET ORAL at 14:55

## 2020-01-09 RX ADMIN — DICLOFENAC SODIUM 4 G: 10 GEL TOPICAL at 20:39

## 2020-01-09 NOTE — ED PROVIDER NOTES
EMERGENCY DEPARTMENT ENCOUNTER    CHIEF COMPLAINT  Chief Complaint: right-sided weakness  History given by: patient  History limited by: patient is a poor historian  Room Number: S512/1  PMD: Atiya Miller MD      HPI:  Pt is a 89 y.o. female who presents to the ED from assisted living facility with complaint of right-sided weakness (RUE > RLE) that she noticed around 0700 this morning. The patient reports her last known normal was around 2100 yesterday night when she went to bed. The patient denies associated dizziness, light-headedness, visual disturbance, numbness/tingling, speech difficulty, headache, or any other pain. The patient denies recent illness, cough, fever, chills, nausea, vomiting, diarrhea, dysuria, urinary frequency, or urinary urgency. The patient denies hx of CVA. The patient reports she is not currently anticoagulated. The patient reports she is not ambulatory at baseline. There are no other complaints at this time.    Duration: pt noticed around 0700 this morning, pt's last known normal was around 2100 yesterday night when she went to bed  Onset: gradual  Timing: constant  Location: RUE and RLE  Quality: weakness  Intensity/Severity: moderate  Progression: unchanged  Associated Symptoms: none specified  Aggravating Factors: none specified  Alleviating Factors: none specified  Previous Episodes: the patient denies hx of CVA.  Treatment before arrival: none specified    PAST MEDICAL HISTORY  Active Ambulatory Problems     Diagnosis Date Noted   • No Active Ambulatory Problems     Resolved Ambulatory Problems     Diagnosis Date Noted   • No Resolved Ambulatory Problems     Past Medical History:   Diagnosis Date   • Depression    • Edema    • Hyperlipidemia    • Hypertension    • Other abnormalities of gait and mobility    • Personal history of urinary (tract) infection    • Repeated falls        PAST SURGICAL HISTORY  Past Surgical History:   Procedure Laterality Date   • APPENDECTOMY      • TONSILLECTOMY         FAMILY HISTORY  History reviewed. No pertinent family history.    SOCIAL HISTORY  Social History     Socioeconomic History   • Marital status: Single     Spouse name: Not on file   • Number of children: Not on file   • Years of education: Not on file   • Highest education level: Not on file   Tobacco Use   • Smoking status: Former Smoker   • Smokeless tobacco: Never Used   Substance and Sexual Activity   • Alcohol use: Yes     Comment: occ   • Drug use: Never       ALLERGIES  Patient has no known allergies.    REVIEW OF SYSTEMS  Review of Systems   Unable to perform ROS: Other (patient is a poor historian)   Constitutional: Negative for chills and fever.   Eyes: Negative for visual disturbance.   Cardiovascular: Negative for chest pain.   Gastrointestinal: Negative for abdominal pain, diarrhea, nausea and vomiting.   Genitourinary: Negative for dysuria, frequency and urgency.   Neurological: Positive for weakness (right-sided, RUE > RLE). Negative for dizziness, speech difficulty, light-headedness, numbness and headaches.       PHYSICAL EXAM  ED Triage Vitals [01/09/20 1054]   Temp Heart Rate Resp BP SpO2   97.9 °F (36.6 °C) 79 16 174/87 95 %      Temp src Heart Rate Source Patient Position BP Location FiO2 (%)   Oral -- -- -- --       Physical Exam   Constitutional: She is oriented to person, place, and time and well-developed, well-nourished, and in no distress. No distress.   HENT:   Head: Normocephalic.   Mouth/Throat: Mucous membranes are normal.   Eyes: EOM are normal.   Neck: Normal range of motion.   Cardiovascular: Normal rate and regular rhythm. Exam reveals no gallop and no friction rub.   No murmur heard.  Pulmonary/Chest: Effort normal. No respiratory distress. She has no wheezes. She has no rhonchi. She has no rales.   Abdominal: Soft. She exhibits no distension. There is no tenderness. There is no guarding.   Musculoskeletal: Normal range of motion. She exhibits no deformity.    Neurological: She is alert and oriented to person, place, and time. She displays weakness (right-sided, RUE > RLE). GCS score is 15.   See procedure notes for NIH Stroke Scale.   Skin: Skin is warm and dry.   Psychiatric:   Calm, cooperative   Nursing note and vitals reviewed.      LAB RESULTS  Lab Results (last 24 hours)     Procedure Component Value Units Date/Time    CBC & Differential [[id number]] Collected:  01/09/20 1147    Specimen:  Blood Updated:  01/09/20 1155    Narrative:       The following orders were created for panel order CBC & Differential.  Procedure                               Abnormality         Status                     ---------                               -----------         ------                     CBC Auto Differential[[id number]]        Abnormal            Final result                 Please view results for these tests on the individual orders.    Comprehensive Metabolic Panel [[id number]]  (Abnormal) Collected:  01/09/20 1147    Specimen:  Blood Updated:  01/09/20 1218     Glucose 115 mg/dL      BUN 14 mg/dL      Creatinine 0.61 mg/dL      Sodium 139 mmol/L      Potassium 3.7 mmol/L      Chloride 106 mmol/L      CO2 23.3 mmol/L      Calcium 8.1 mg/dL      Total Protein 6.1 g/dL      Albumin 3.40 g/dL      ALT (SGPT) 10 U/L      AST (SGOT) 13 U/L      Alkaline Phosphatase 86 U/L      Total Bilirubin 0.5 mg/dL      eGFR Non African Amer 92 mL/min/1.73      Globulin 2.7 gm/dL      A/G Ratio 1.3 g/dL      BUN/Creatinine Ratio 23.0     Anion Gap 9.7 mmol/L     Narrative:       GFR Normal >60  Chronic Kidney Disease <60  Kidney Failure <15      Troponin [[id number]]  (Normal) Collected:  01/09/20 1147    Specimen:  Blood Updated:  01/09/20 1221     Troponin T <0.010 ng/mL     Narrative:       Troponin T Reference Range:  <= 0.03 ng/mL-   Negative for AMI  >0.03 ng/mL-     Abnormal for myocardial necrosis.  Clinicians would have to utilize clinical acumen, EKG, Troponin and serial changes  to determine if it is an Acute Myocardial Infarction or myocardial injury due to an underlying chronic condition.       Results may be falsely decreased if patient taking Biotin.      Magnesium [[id number]]  (Normal) Collected:  01/09/20 1147    Specimen:  Blood Updated:  01/09/20 1218     Magnesium 1.8 mg/dL     CBC Auto Differential [[id number]]  (Abnormal) Collected:  01/09/20 1147    Specimen:  Blood Updated:  01/09/20 1155     WBC 7.88 10*3/mm3      RBC 4.99 10*6/mm3      Hemoglobin 15.4 g/dL      Hematocrit 46.4 %      MCV 93.0 fL      MCH 30.9 pg      MCHC 33.2 g/dL      RDW 12.2 %      RDW-SD 41.8 fl      MPV 11.1 fL      Platelets 212 10*3/mm3      Neutrophil % 70.4 %      Lymphocyte % 11.0 %      Monocyte % 8.2 %      Eosinophil % 9.3 %      Basophil % 0.8 %      Immature Grans % 0.3 %      Neutrophils, Absolute 5.55 10*3/mm3      Lymphocytes, Absolute 0.87 10*3/mm3      Monocytes, Absolute 0.65 10*3/mm3      Eosinophils, Absolute 0.73 10*3/mm3      Basophils, Absolute 0.06 10*3/mm3      Immature Grans, Absolute 0.02 10*3/mm3      nRBC 0.0 /100 WBC     Urinalysis With Microscopic If Indicated (No Culture) - Urine, Catheter [[id number]]  (Abnormal) Collected:  01/09/20 1303    Specimen:  Urine, Catheter Updated:  01/09/20 1349     Color, UA Yellow     Appearance, UA Turbid     pH, UA 7.0     Specific Gravity, UA 1.016     Glucose, UA Negative     Ketones, UA Negative     Bilirubin, UA Negative     Blood, UA Moderate (2+)     Protein, UA Negative     Leuk Esterase, UA Large (3+)     Nitrite, UA Negative     Urobilinogen, UA 1.0 E.U./dL    Urinalysis, Microscopic Only - Urine, Catheter [[id number]]  (Abnormal) Collected:  01/09/20 1303    Specimen:  Urine, Catheter Updated:  01/09/20 1351     RBC, UA 6-12 /HPF      WBC, UA Too Numerous to Count /HPF      Bacteria, UA 2+ /HPF      Squamous Epithelial Cells, UA 13-20 /HPF      Hyaline Casts, UA 3-6 /LPF      Methodology Manual Light Microscopy          I ordered  the above labs and reviewed the results    RADIOLOGY  CT Head Without Contrast   Final Result   No evidence of acute infarction or hemorrhage. Moderate   vascular calcification and moderate-to-severe small vessel ischemic   disease is noted. Further evaluation could be performed with MRI   examination of the brain as indicated.       The above information was called to and discussed with Dr. Tan.               Radiation dose reduction techniques were utilized, including automated   exposure control and exposure modulation based on body size.       This report was finalized on 1/9/2020 4:42 PM by Dr. Tin Arndt M.D.          XR Chest 1 View   Final Result   FINDINGS AND IMPRESSION:   Minimal left basilar atelectasis and or pleural apical scarring is   present. No pleural effusion or pneumothorax is seen. The heart is   normal in size.       This report was finalized on 1/9/2020 12:59 PM by Dr. Anatoliy Lares M.D.          MRI Brain Without Contrast    (Results Pending)   CT Angiogram Head    (Results Pending)   CT Angiogram Neck    (Results Pending)        I ordered the above noted radiological studies. Interpreted by radiologist. Discussed with radiologist (Dr. Arndt). Reviewed by me in PACS.       PROCEDURES  Procedures   NIH Stroke Scale  01/09/2020  1202    Interval: baseline  1a. Level of Consciousness: 0-->Alert, keenly responsive  1b. LOC Questions: 0-->Answers both questions correctly  1c. LOC Commands: 0-->Performs both tasks correctly  2. Best Gaze: 0-->Normal  3. Visual: 1-->Partial hemianopia  4. Facial Palsy: 0-->Normal symmetrical movements  5a. Motor Arm, Left: 0-->No drift, limb holds 90 (or 45) degrees for full 10 secs  5b. Motor Arm, Right: 1-->Drift, limb holds 90 (or 45) degrees, but drifts down before full 10 secs, does not hit bed or other support  6a. Motor Leg, Left: 0-->No drift, leg holds 30 degree position for full 5 secs  6b. Motor Leg, Right: 1-->Drift, leg falls by the end of the  5-sec period but does not hit bed  7. Limb Ataxia: 0-->Absent  8. Sensory: 0-->Normal, no sensory loss  9. Best Language: 0-->No aphasia, normal  10. Dysarthria: 0-->Normal  11. Extinction and Inattention (formerly Neglect): 0-->No abnormality    Total (NIH Stroke Scale): 3      EKG          EKG time: 1142  Rhythm/Rate: NSR, rate of 70  P waves and VT: nml; nml  QRS, axis: RBBB with LAFB, LAD   ST and T waves: no acute ischemic changes, no STEMI     Interpreted Contemporaneously by me, independently viewed  There are no emergent changes when compared to prior EKG from 04/2018.    PROGRESS AND CONSULTS  ED Course as of Jan 09 2057   Thu Jan 09, 2020   1541 She came in today for evaluation for reports of weakness on her right side, worse in the right arm but also present in the right leg.  Went to bed at 9 PM last night feeling normal woke up around 7 AM this morning with the feelings of the right-sided weakness. NIH 1 on my exam, discussed with Neurology, who do not feel she will be tpa or interventional candidate, non-con head ct and admission for further stroke work up recommended. Patient also with evidence of mild UTI, will treat with Rocephin. [facility] to hospitalize. Patient updated on course and plan and need for hospital stay.     [DC]      ED Course User Index  [DC] Jamil Tan MD      The patient is not a tPA candidate d/t severity of symptoms.    1127 Ordered CXR, EKG, UA, and blood work for further evaluation.    1213 Placed call to Stroke Neurology for consult.    1214 Received a call from Dr. Griffiths (Stroke Neurology) and discussed pt's case. Dr. Griffiths recommended that I check a CT Head without contrast and then admit the patient for further stroke workup.    1217 Ordered CT Head without contrast for further evaluation.    1440 Received a call from Dr. Arndt, radiology, who stated that the CT Head shows nothing acute.    1448 Ordered  mg. Placed call to [facility] for admission.    1451 Rechecked the patient  who is resting comfortably and in NAD. Patient is stable. BP- 169/72 HR- 73 Temp- 97.9 °F (36.6 °C) (Oral) O2 sat- 93%. Informed the patient of her negative acute CT Head. Discussed the plan for admission for further testing and management. Pt understands and agrees with the plan, all questions answered.    1530 Received a call from Dr. Elham Bruner ([facility]) and discussed pt's case. Dr. Elham Bruner agreed with plan to admit the patient to tele obs for further evaluation and management.    1534 Ordered Rocephin to treat patient's acute urinary tract infection.    MEDICAL DECISION MAKING  Results were reviewed/discussed with the patient and they were also made aware of online access. Pt also made aware that some labs, such as cultures, will not be resulted during ER visit and follow up with PMD is necessary.     MDM  Number of Diagnoses or Management Options  Hypertension, unspecified type:   Weakness of right upper extremity:      Amount and/or Complexity of Data Reviewed  Clinical lab tests: ordered and reviewed (UA RBC: 6-12, UA WBC: TNTC, UA Bacteria: 2+, UA Squamous Cells: 13-20, Troponin is <0.010, and WBC: 7.88)  Tests in the radiology section of CPT®: ordered and reviewed (CT Head is negative acute. CXR: Minimal left basilar atelectasis and or pleural apical scarring is present. No pleural effusion or pneumothorax is seen. The heart is normal in size.)  Tests in the medicine section of CPT®: ordered and reviewed (See procedure notes for EKG interpretation.)  Discussion of test results with the performing providers: yes (Dr. Arndt (Radiology))  Decide to obtain previous medical records or to obtain history from someone other than the patient: yes  Review and summarize past medical records: yes (The patient has no pertinent recent records in Epic.)  Discuss the patient with other providers: yes (Dr. Griffiths (Stroke Neurology) and Dr. Elham Bruner ([facility]))  Independent visualization of images, tracings, or specimens:  yes    Patient Progress  Patient progress: stable         DIAGNOSIS  Final diagnoses:   Weakness of right upper extremity   Hypertension, unspecified type   Urinary tract infection in female       DISPOSITION  ADMISSION TO TELE OBS    Discussed treatment plan and reason for admission with pt/family and admitting physician.  Pt/family voiced understanding of the plan for admission for further testing/treatment as needed.     Latest Documented Vital Signs:  As of 8:57 PM  BP- 146/76 HR- 81 Temp- 97.7 °F (36.5 °C) (Oral) O2 sat- 95%    --  Documentation assistance provided by zachary Chi for Dr. Dominick MD.  Information recorded by the scribe was done at my direction and has been verified and validated by me.     Ronel Chi  01/09/20 9874       Jamil Tan MD  01/09/20 2429

## 2020-01-09 NOTE — PROGRESS NOTES
Clinical Pharmacy Services: Medication History    Karen Greer is a 89 y.o. female presenting to [facility] for   Chief Complaint   Patient presents with    Weakness - Generalized     [facility] REPORTS PT WITH RIGHT SIDED WEAKNESS AND RIGHT ARM DRIFT; PT WITH GENERALIZED WEAKNESS, NO C/O PAIN       She  has a past medical history of Depression, Edema, Hyperlipidemia, Hypertension, Other abnormalities of gait and mobility, Personal history of urinary (tract) infection, and Repeated falls.    Allergies as of 01/09/2020    (No Known Allergies)       Medication information was obtained from: nursing home med list  Pharmacy and Phone Number:     Prior to Admission Medications       Prescriptions Last Dose Informant Patient Reported? Taking?    acetaminophen (TYLENOL) 500 MG tablet  Nursing Home Yes Yes    Take 500 mg by mouth Every 6 (Six) Hours As Needed for Mild Pain .    atorvastatin (LIPITOR) 10 MG tablet  Nursing Home Yes Yes    Take 10 mg by mouth Daily.    diclofenac (VOLTAREN) 1 % gel gel  Nursing Home Yes Yes    Apply 4 g topically to the appropriate area as directed 4 (Four) Times a Day.    guaiFENesin (MUCINEX) 600 MG 12 hr tablet  Nursing Home Yes Yes    Take 1 tablet by mouth 2 (Two) Times a Day As Needed.    loratadine (CLARITIN) 10 MG tablet  Nursing Home Yes Yes    Take 10 mg by mouth Daily.    naproxen sodium (ALEVE) 220 MG tablet  Nursing Home Yes Yes    Take 220 mg by mouth 2 (Two) Times a Day As Needed.    nitrofurantoin, macrocrystal-monohydrate, (MACROBID) 100 MG capsule  Nursing Home Yes Yes    Take 100 mg by mouth Every Night.    PARoxetine (PAXIL) 20 MG tablet  Nursing Home Yes Yes    Take 20 mg by mouth Every Morning.    furosemide (LASIX) 40 MG tablet   Yes No    Take 40 mg by mouth Daily.                Medication notes:     This medication list is complete to the best of my knowledge as of 1/9/2020    Please call if questions.    [facility]  Medication History  Technician  [phone]    1/9/2020 4:38 PM

## 2020-01-10 ENCOUNTER — APPOINTMENT (OUTPATIENT)
Dept: CARDIOLOGY | Facility: HOSPITAL | Age: 85
End: 2020-01-10

## 2020-01-10 ENCOUNTER — APPOINTMENT (OUTPATIENT)
Dept: CT IMAGING | Facility: HOSPITAL | Age: 85
End: 2020-01-10

## 2020-01-10 PROBLEM — R82.71 ASYMPTOMATIC BACTERIURIA: Status: ACTIVE | Noted: 2020-01-09

## 2020-01-10 LAB
ANION GAP SERPL CALCULATED.3IONS-SCNC: 10.9 MMOL/L (ref 5–15)
AV HCM GRAD VALS: 216 MMHG
BASOPHILS # BLD AUTO: 0.06 10*3/MM3 (ref 0–0.2)
BASOPHILS NFR BLD AUTO: 0.8 % (ref 0–1.5)
BH CV ECHO MEAS - ACS: 1.9 CM
BH CV ECHO MEAS - AO MEAN PG (FULL): 2 MMHG
BH CV ECHO MEAS - AO MEAN PG: 6 MMHG
BH CV ECHO MEAS - AO ROOT AREA (BSA CORRECTED): 1.7
BH CV ECHO MEAS - AO ROOT AREA: 9.1 CM^2
BH CV ECHO MEAS - AO ROOT DIAM: 3.4 CM
BH CV ECHO MEAS - AO V2 MAX: 164 CM/SEC
BH CV ECHO MEAS - AO V2 MEAN: 119 CM/SEC
BH CV ECHO MEAS - AO V2 VTI: 32.3 CM
BH CV ECHO MEAS - ASC AORTA: 3 CM
BH CV ECHO MEAS - AVA(I,A): 2.7 CM^2
BH CV ECHO MEAS - AVA(I,D): 2.7 CM^2
BH CV ECHO MEAS - BSA(HAYCOCK): 2.1 M^2
BH CV ECHO MEAS - BSA: 2 M^2
BH CV ECHO MEAS - BZI_BMI: 28.1 KILOGRAMS/M^2
BH CV ECHO MEAS - BZI_METRIC_HEIGHT: 175.3 CM
BH CV ECHO MEAS - BZI_METRIC_WEIGHT: 86.2 KG
BH CV ECHO MEAS - EDV(CUBED): 50.7 ML
BH CV ECHO MEAS - EDV(MOD-SP2): 54 ML
BH CV ECHO MEAS - EDV(MOD-SP4): 57 ML
BH CV ECHO MEAS - EDV(TEICH): 58.1 ML
BH CV ECHO MEAS - EF(CUBED): 79 %
BH CV ECHO MEAS - EF(MOD-BP): 63 %
BH CV ECHO MEAS - EF(MOD-SP2): 59.3 %
BH CV ECHO MEAS - EF(MOD-SP4): 64.9 %
BH CV ECHO MEAS - EF(TEICH): 72.1 %
BH CV ECHO MEAS - ESV(CUBED): 10.6 ML
BH CV ECHO MEAS - ESV(MOD-SP2): 22 ML
BH CV ECHO MEAS - ESV(MOD-SP4): 20 ML
BH CV ECHO MEAS - ESV(TEICH): 16.2 ML
BH CV ECHO MEAS - FS: 40.5 %
BH CV ECHO MEAS - IVS/LVPW: 1
BH CV ECHO MEAS - IVSD: 1.2 CM
BH CV ECHO MEAS - LAT PEAK E' VEL: 8 CM/SEC
BH CV ECHO MEAS - LV DIASTOLIC VOL/BSA (35-75): 28.2 ML/M^2
BH CV ECHO MEAS - LV MASS(C)D: 147.3 GRAMS
BH CV ECHO MEAS - LV MASS(C)DI: 72.9 GRAMS/M^2
BH CV ECHO MEAS - LV MEAN PG: 4 MMHG
BH CV ECHO MEAS - LV SYSTOLIC VOL/BSA (12-30): 9.9 ML/M^2
BH CV ECHO MEAS - LV V1 MAX: 129 CM/SEC
BH CV ECHO MEAS - LV V1 MEAN: 99.8 CM/SEC
BH CV ECHO MEAS - LV V1 VTI: 27.4 CM
BH CV ECHO MEAS - LVIDD: 3.7 CM
BH CV ECHO MEAS - LVIDS: 2.2 CM
BH CV ECHO MEAS - LVLD AP2: 7.5 CM
BH CV ECHO MEAS - LVLD AP4: 7.6 CM
BH CV ECHO MEAS - LVLS AP2: 6.2 CM
BH CV ECHO MEAS - LVLS AP4: 6.4 CM
BH CV ECHO MEAS - LVOT AREA (M): 3.1 CM^2
BH CV ECHO MEAS - LVOT AREA: 3.1 CM^2
BH CV ECHO MEAS - LVOT DIAM: 2 CM
BH CV ECHO MEAS - LVPWD: 1.2 CM
BH CV ECHO MEAS - MED PEAK E' VEL: 7 CM/SEC
BH CV ECHO MEAS - MV A DUR: 0.15 SEC
BH CV ECHO MEAS - MV A MAX VEL: 112 CM/SEC
BH CV ECHO MEAS - MV DEC SLOPE: 416 CM/SEC^2
BH CV ECHO MEAS - MV DEC TIME: 252 SEC
BH CV ECHO MEAS - MV E MAX VEL: 77.5 CM/SEC
BH CV ECHO MEAS - MV E/A: 0.69
BH CV ECHO MEAS - MV MEAN PG: 2 MMHG
BH CV ECHO MEAS - MV P1/2T MAX VEL: 79.5 CM/SEC
BH CV ECHO MEAS - MV P1/2T: 56 MSEC
BH CV ECHO MEAS - MV V2 MEAN: 63.3 CM/SEC
BH CV ECHO MEAS - MV V2 VTI: 25.6 CM
BH CV ECHO MEAS - MVA P1/2T LCG: 2.8 CM^2
BH CV ECHO MEAS - MVA(P1/2T): 3.9 CM^2
BH CV ECHO MEAS - MVA(VTI): 3.4 CM^2
BH CV ECHO MEAS - PA ACC SLOPE: 3.7 CM/SEC^2
BH CV ECHO MEAS - PA ACC TIME: 0.12 SEC
BH CV ECHO MEAS - PA MAX PG (FULL): -0.22 MMHG
BH CV ECHO MEAS - PA MAX PG: 2.1 MMHG
BH CV ECHO MEAS - PA PR(ACCEL): 26.8 MMHG
BH CV ECHO MEAS - PA V2 MAX: 73 CM/SEC
BH CV ECHO MEAS - PVA(V,A): 4.4 CM^2
BH CV ECHO MEAS - PVA(V,D): 4.4 CM^2
BH CV ECHO MEAS - QP/QS: 0.72
BH CV ECHO MEAS - RAP SYSTOLE: 3 MMHG
BH CV ECHO MEAS - RV MAX PG: 2.4 MMHG
BH CV ECHO MEAS - RV MEAN PG: 1 MMHG
BH CV ECHO MEAS - RV V1 MAX: 76.7 CM/SEC
BH CV ECHO MEAS - RV V1 MEAN: 49.5 CM/SEC
BH CV ECHO MEAS - RV V1 VTI: 15 CM
BH CV ECHO MEAS - RVOT AREA: 4.2 CM^2
BH CV ECHO MEAS - RVOT DIAM: 2.3 CM
BH CV ECHO MEAS - RVSP: 26.4 MMHG
BH CV ECHO MEAS - SI(AO): 145.1 ML/M^2
BH CV ECHO MEAS - SI(CUBED): 19.8 ML/M^2
BH CV ECHO MEAS - SI(LVOT): 42.6 ML/M^2
BH CV ECHO MEAS - SI(MOD-SP2): 15.8 ML/M^2
BH CV ECHO MEAS - SI(MOD-SP4): 18.3 ML/M^2
BH CV ECHO MEAS - SI(TEICH): 20.7 ML/M^2
BH CV ECHO MEAS - SV(AO): 293.3 ML
BH CV ECHO MEAS - SV(CUBED): 40 ML
BH CV ECHO MEAS - SV(LVOT): 86.1 ML
BH CV ECHO MEAS - SV(MOD-SP2): 32 ML
BH CV ECHO MEAS - SV(MOD-SP4): 37 ML
BH CV ECHO MEAS - SV(RVOT): 62.3 ML
BH CV ECHO MEAS - SV(TEICH): 41.9 ML
BH CV ECHO MEAS - TAPSE (>1.6): 2.1 CM2
BH CV ECHO MEAS - TR MAX VEL: 242 CM/SEC
BH CV ECHO MEASUREMENTS AVERAGE E/E' RATIO: 10.33
BH CV VAS BP RIGHT ARM: NORMAL MMHG
BH CV XLRA - RV BASE: 2.7 CM
BH CV XLRA - TDI S': 12 CM/SEC
BUN BLD-MCNC: 12 MG/DL (ref 8–23)
BUN/CREAT SERPL: 20.3 (ref 7–25)
CA-I BLD-MCNC: 5 MG/DL (ref 4.6–5.4)
CA-I SERPL ISE-MCNC: 1.24 MMOL/L (ref 1.15–1.35)
CALCIUM SPEC-SCNC: 8.5 MG/DL (ref 8.2–9.6)
CHLORIDE SERPL-SCNC: 102 MMOL/L (ref 98–107)
CHOLEST SERPL-MCNC: 127 MG/DL (ref 0–200)
CO2 SERPL-SCNC: 22.1 MMOL/L (ref 22–29)
CREAT BLD-MCNC: 0.59 MG/DL (ref 0.57–1)
DEPRECATED RDW RBC AUTO: 40.1 FL (ref 37–54)
EOSINOPHIL # BLD AUTO: 0.44 10*3/MM3 (ref 0–0.4)
EOSINOPHIL NFR BLD AUTO: 6 % (ref 0.3–6.2)
ERYTHROCYTE [DISTWIDTH] IN BLOOD BY AUTOMATED COUNT: 12.2 % (ref 12.3–15.4)
GFR SERPL CREATININE-BSD FRML MDRD: 96 ML/MIN/1.73
GLUCOSE BLD-MCNC: 93 MG/DL (ref 65–99)
GLUCOSE BLDC GLUCOMTR-MCNC: 93 MG/DL (ref 70–130)
HBA1C MFR BLD: 6 % (ref 4.8–5.6)
HCT VFR BLD AUTO: 42.2 % (ref 34–46.6)
HDLC SERPL-MCNC: 57 MG/DL (ref 40–60)
HGB BLD-MCNC: 14.4 G/DL (ref 12–15.9)
IMM GRANULOCYTES # BLD AUTO: 0.02 10*3/MM3 (ref 0–0.05)
IMM GRANULOCYTES NFR BLD AUTO: 0.3 % (ref 0–0.5)
LDLC SERPL CALC-MCNC: 55 MG/DL (ref 0–100)
LDLC/HDLC SERPL: 0.97 {RATIO}
LEFT ATRIUM VOLUME INDEX: 15 ML/M2
LV EF 2D ECHO EST: 63 %
LYMPHOCYTES # BLD AUTO: 0.99 10*3/MM3 (ref 0.7–3.1)
LYMPHOCYTES NFR BLD AUTO: 13.6 % (ref 19.6–45.3)
MAXIMAL PREDICTED HEART RATE: 130 BPM
MCH RBC QN AUTO: 31.4 PG (ref 26.6–33)
MCHC RBC AUTO-ENTMCNC: 34.1 G/DL (ref 31.5–35.7)
MCV RBC AUTO: 91.9 FL (ref 79–97)
MONOCYTES # BLD AUTO: 0.77 10*3/MM3 (ref 0.1–0.9)
MONOCYTES NFR BLD AUTO: 10.5 % (ref 5–12)
NEUTROPHILS # BLD AUTO: 5.02 10*3/MM3 (ref 1.7–7)
NEUTROPHILS NFR BLD AUTO: 68.8 % (ref 42.7–76)
NRBC BLD AUTO-RTO: 0 /100 WBC (ref 0–0.2)
PLATELET # BLD AUTO: 226 10*3/MM3 (ref 140–450)
PMV BLD AUTO: 11.9 FL (ref 6–12)
POTASSIUM BLD-SCNC: 3.6 MMOL/L (ref 3.5–5.2)
RBC # BLD AUTO: 4.59 10*6/MM3 (ref 3.77–5.28)
SODIUM BLD-SCNC: 135 MMOL/L (ref 136–145)
STRESS TARGET HR: 111 BPM
TRIGL SERPL-MCNC: 74 MG/DL (ref 0–150)
TSH SERPL DL<=0.05 MIU/L-ACNC: 1.36 UIU/ML (ref 0.27–4.2)
VLDLC SERPL-MCNC: 14.8 MG/DL (ref 5–40)
WBC NRBC COR # BLD: 7.3 10*3/MM3 (ref 3.4–10.8)

## 2020-01-10 PROCEDURE — 82962 GLUCOSE BLOOD TEST: CPT

## 2020-01-10 PROCEDURE — 80061 LIPID PANEL: CPT | Performed by: INTERNAL MEDICINE

## 2020-01-10 PROCEDURE — 99204 OFFICE O/P NEW MOD 45 MIN: CPT | Performed by: PSYCHIATRY & NEUROLOGY

## 2020-01-10 PROCEDURE — 93306 TTE W/DOPPLER COMPLETE: CPT

## 2020-01-10 PROCEDURE — 92610 EVALUATE SWALLOWING FUNCTION: CPT

## 2020-01-10 PROCEDURE — 25810000003 SODIUM CHLORIDE 0.9 % WITH KCL 20 MEQ 20-0.9 MEQ/L-% SOLUTION: Performed by: INTERNAL MEDICINE

## 2020-01-10 PROCEDURE — 70496 CT ANGIOGRAPHY HEAD: CPT

## 2020-01-10 PROCEDURE — 70498 CT ANGIOGRAPHY NECK: CPT

## 2020-01-10 PROCEDURE — 93306 TTE W/DOPPLER COMPLETE: CPT | Performed by: INTERNAL MEDICINE

## 2020-01-10 PROCEDURE — 83036 HEMOGLOBIN GLYCOSYLATED A1C: CPT | Performed by: INTERNAL MEDICINE

## 2020-01-10 PROCEDURE — 82330 ASSAY OF CALCIUM: CPT | Performed by: INTERNAL MEDICINE

## 2020-01-10 PROCEDURE — 85025 COMPLETE CBC W/AUTO DIFF WBC: CPT | Performed by: INTERNAL MEDICINE

## 2020-01-10 PROCEDURE — 97166 OT EVAL MOD COMPLEX 45 MIN: CPT

## 2020-01-10 PROCEDURE — 84443 ASSAY THYROID STIM HORMONE: CPT | Performed by: INTERNAL MEDICINE

## 2020-01-10 PROCEDURE — 25010000002 IOPAMIDOL 61 % SOLUTION: Performed by: INTERNAL MEDICINE

## 2020-01-10 PROCEDURE — 97535 SELF CARE MNGMENT TRAINING: CPT

## 2020-01-10 PROCEDURE — 25010000002 CEFTRIAXONE PER 250 MG: Performed by: INTERNAL MEDICINE

## 2020-01-10 PROCEDURE — 80048 BASIC METABOLIC PNL TOTAL CA: CPT | Performed by: INTERNAL MEDICINE

## 2020-01-10 RX ORDER — AMLODIPINE BESYLATE 5 MG/1
5 TABLET ORAL
Status: DISCONTINUED | OUTPATIENT
Start: 2020-01-10 | End: 2020-01-11

## 2020-01-10 RX ADMIN — IOPAMIDOL 95 ML: 612 INJECTION, SOLUTION INTRAVENOUS at 10:40

## 2020-01-10 RX ADMIN — POTASSIUM CHLORIDE AND SODIUM CHLORIDE 75 ML/HR: 900; 150 INJECTION, SOLUTION INTRAVENOUS at 16:18

## 2020-01-10 RX ADMIN — AMLODIPINE BESYLATE 5 MG: 5 TABLET ORAL at 15:41

## 2020-01-10 RX ADMIN — POTASSIUM CHLORIDE AND SODIUM CHLORIDE 75 ML/HR: 900; 150 INJECTION, SOLUTION INTRAVENOUS at 00:40

## 2020-01-10 RX ADMIN — CETIRIZINE HYDROCHLORIDE 5 MG: 10 TABLET, FILM COATED ORAL at 08:28

## 2020-01-10 RX ADMIN — SODIUM CHLORIDE, PRESERVATIVE FREE 10 ML: 5 INJECTION INTRAVENOUS at 21:00

## 2020-01-10 RX ADMIN — ASPIRIN 325 MG: 325 TABLET ORAL at 08:28

## 2020-01-10 RX ADMIN — ATORVASTATIN CALCIUM 80 MG: 80 TABLET, FILM COATED ORAL at 08:28

## 2020-01-10 RX ADMIN — DICLOFENAC SODIUM 4 G: 10 GEL TOPICAL at 21:07

## 2020-01-10 RX ADMIN — SODIUM CHLORIDE, PRESERVATIVE FREE 10 ML: 5 INJECTION INTRAVENOUS at 00:40

## 2020-01-10 RX ADMIN — PAROXETINE HYDROCHLORIDE 20 MG: 20 TABLET, FILM COATED ORAL at 06:02

## 2020-01-10 RX ADMIN — CEFTRIAXONE SODIUM 1 G: 1 INJECTION, SOLUTION INTRAVENOUS at 14:15

## 2020-01-10 RX ADMIN — DICLOFENAC SODIUM 4 G: 10 GEL TOPICAL at 08:28

## 2020-01-10 RX ADMIN — SODIUM CHLORIDE, PRESERVATIVE FREE 10 ML: 5 INJECTION INTRAVENOUS at 08:29

## 2020-01-10 NOTE — THERAPY DISCHARGE NOTE
Acute Care - Speech Language Pathology   Swallow Initial Evaluation [facility]     Patient Name: Karen Greer  : 8/15/1929  MRN: [id number]  Today's Date: 1/10/2020               Admit Date: 2020    Visit Dx:     ICD-10-CM ICD-9-CM   1. Weakness of right upper extremity R29.898 729.89   2. Hypertension, unspecified type I10 401.9   3. Urinary tract infection in female N39.0 599.0     Patient Active Problem List   Diagnosis   • Weakness of right upper extremity   • Asymptomatic bacteriuria   • Hypertension   • Hyperlipidemia   • Acute ischemic stroke      Past Medical History:   Diagnosis Date   • Depression    • Edema    • Hyperlipidemia    • Hypertension    • Other abnormalities of gait and mobility    • Personal history of urinary (tract) infection    • Repeated falls      Past Surgical History:   Procedure Laterality Date   • APPENDECTOMY     • TONSILLECTOMY          SWALLOW EVALUATION (last 72 hours)      SLP Adult Swallow Evaluation     Row Name 01/10/20 1400                   Rehab Evaluation    Document Type  evaluation  -        Patient/Family Observations  daughter present  -        Patient Effort  adequate  -           General Information    Patient Profile Reviewed  yes  -        Pertinent History Of Current Problem  stroke  -        Current Method of Nutrition  regular textures;thin liquids;other (see comments) passed RN SS  -        Precautions/Limitations, Vision  WFL;for purposes of eval  -        Precautions/Limitations, Hearing  WFL;for purposes of eval  -        Prior Level of Function-Communication  WFL;other (see comments) daughter reports cog changes with age  -        Prior Level of Function-Swallowing  no diet consistency restrictions  -        Plans/Goals Discussed with  patient;family;agreed upon  -        Barriers to Rehab  none identified  -        Patient's Goals for Discharge  patient did not state  -        Family Goals for Discharge  family did not  state  -           Pain Assessment    Additional Documentation  Pain Scale: Word Pre/Post-Treatment (Group)  -           Pain Scale: Word Pre/Post-Treatment    Pain: Word Scale, Pretreatment  0 - no pain  -        Pain: Word Scale, Post-Treatment  0 - no pain  -           Oral Motor and Function    Dentition Assessment  natural, present and adequate  -        Volitional Swallow  WFL  -        Volitional Cough  WFL  -           Oral Musculature and Cranial Nerve Assessment    Oral Motor General Assessment  WFL  -        Oral Motor, Comment  Neuro reported subtle droop and dysarthria, SLP did not observe this date  -           Clinical Swallow Eval    Oral Prep Phase  WFL  -        Oral Transit  WFL  -        Oral Residue  WFL  -        Pharyngeal Phase  WFL  -        Clinical Swallow Evaluation Summary  No overt s/s of aspiration noted across trials of thins, puree, mixed mech soft, or regular. No oral residue post swallow. Laryngeal elevation was palpable and timely. Voice clear post swallow.   -           Clinical Impression    SLP Swallowing Diagnosis  functional oral phase;functional pharyngeal phase  -        Functional Impact  no impact on function  -        Swallow Criteria for Skilled Therapeutic Interventions Met  no problems identified which require skilled intervention  -           Recommendations    Therapy Frequency (Swallow)  evaluation only  -        SLP Diet Recommendation  regular textures;thin liquids  -        Recommended Precautions and Strategies  upright posture during/after eating  -        Monitor for Signs of Aspiration  yes  -          User Key  (r) = Recorded By, (t) = Taken By, (c) = Cosigned By    Initials Name Effective Dates     Isi Prado MS CCC-SLP 03/07/18 -           EDUCATION  The patient has been educated in the following areas:   Dysphagia (Swallowing Impairment).    SLP Recommendation and Plan  SLP Swallowing Diagnosis: functional  oral phase, functional pharyngeal phase  SLP Diet Recommendation: regular textures, thin liquids  Recommended Precautions and Strategies: upright posture during/after eating        Monitor for Signs of Aspiration: yes     Swallow Criteria for Skilled Therapeutic Interventions Met: no problems identified which require skilled intervention        Therapy Frequency (Swallow): evaluation only          Plan of Care Reviewed With: patient, family  Progress: improving  Outcome Summary: Pt exhibits a functional oropharyngeal swallow. SLP recs regular and thins, will sign off. No dysarthria appreciated this date.         SLP Outcome Measures (last 72 hours)      SLP Outcome Measures     Row Name 01/10/20 1400             SLP Outcome Measures    Outcome Measure Used?  Adult NOMS  -         Adult FCM Scores    FCM Chosen  Swallowing  -      Swallowing FCM Score  7  -        User Key  (r) = Recorded By, (t) = Taken By, (c) = Cosigned By    Initials Name Effective Dates     Isi Prado MS CCC-SLP 03/07/18 -            Time Calculation:   Time Calculation- SLP     Row Name 01/10/20 1442             Time Calculation- [facility]    SLP Start Time  1400  -      SLP Received On  01/10/20  -        User Key  (r) = Recorded By, (t) = Taken By, (c) = Cosigned By    Initials Name Provider Type     Isi Prado MS CCC-SLP Speech and Language Pathologist          Therapy Charges for Today     Code Description Service Date Service Provider Modifiers Qty    [id number] HC ST EVAL ORAL PHARYNG SWALLOW 3 1/10/2020 Isi Prado MS CCC-SLP GN 1               Isi Prado MS CCC-LUCA  1/10/2020

## 2020-01-10 NOTE — PROGRESS NOTES
Continued Stay Note  [facility]     Patient Name: Karen Greer  MRN: [id number]  Today's Date: 1/10/2020    Admit Date: 1/9/2020    Discharge Plan     Row Name 01/10/20 1322       Plan    Plan  Plan is to return to her personal care bed at [facility]. Need written scripts for any new meds.  If DC'd over w/e, will need ambulance or w/c van transport. Packet placed on chart.    Plan Comments  Spoke with pt at bedside, her son Lee and her dtr Bartolo.  Facesheet info confirmed.   Pt lives at [facility].  She is w/c bound at the facility.  CCP spoke with Paige/ [facility] who states that pt can rdturn upon DC.  WIll need packet, and written rx's for any new meds.  Pt's son and dtr are in agreement with pt returning to [facility] upon DC.  They cannot transport.  If DC'd on a weekday, [facility] can probably provide transport, but not over the weekend.         Discharge Codes    No documentation.             Cecily Hadley RN

## 2020-01-10 NOTE — SIGNIFICANT NOTE
01/10/20 1348   Rehab Time/Intention   Evaluation Not Performed other (see comments)  (SPoke with pt and daughter, pt is WC bound and uses [facility] for transfers at [place]. SHe plans to return to personal care. No skilled PT indicated at this time.)   Rehab Treatment   Discipline physical therapist

## 2020-01-10 NOTE — H&P
PCP: Atiya Miller MD    Chief complaint   Chief Complaint   Patient presents with   • Weakness - Generalized     [facility] REPORTS PT WITH RIGHT SIDED WEAKNESS AND RIGHT ARM DRIFT; PT WITH GENERALIZED WEAKNESS, NO C/O PAIN       HPI  Patient is a 89 y.o. female presents with history of hypertension and hyperlipidemia who presents to the ER after she woke up and she found that she had right upper and lower extremity weakness upper worse than lower.  She noticed at first that she could not sit up and she kept leaning to the right.  There was an aide there at her assisted living that assisted her but she was not able to hold a coffee cup and that was significantly different than before.  Patient is right-handed.  Denies any history of stroke or any family history of stroke.  And patient is not on any aspirin.  161/76 in ER    PAST MEDICAL HISTORY  Past Medical History:   Diagnosis Date   • Depression    • Edema    • Hyperlipidemia    • Hypertension    • Other abnormalities of gait and mobility    • Personal history of urinary (tract) infection    • Repeated falls        PAST SURGICAL HISTORY  Past Surgical History:   Procedure Laterality Date   • APPENDECTOMY     • TONSILLECTOMY         FAMILY HISTORY  History reviewed. No pertinent family history.    SOCIAL HISTORY  Social History     Tobacco Use   • Smoking status: Former Smoker   • Smokeless tobacco: Never Used   Substance Use Topics   • Alcohol use: Yes     Comment: occ   • Drug use: Never       MEDICATIONS:  Medications Prior to Admission   Medication Sig Dispense Refill Last Dose   • acetaminophen (TYLENOL) 500 MG tablet Take 500 mg by mouth Every 6 (Six) Hours As Needed for Mild Pain .   Past Month at Unknown time   • atorvastatin (LIPITOR) 10 MG tablet Take 10 mg by mouth Daily.   1/8/2020 at Unknown time   • diclofenac (VOLTAREN) 1 % gel gel Apply 4 g topically to the appropriate area as directed 4 (Four) Times a Day.   1/8/2020 at Unknown time  "  • guaiFENesin (MUCINEX) 600 MG 12 hr tablet Take 1 tablet by mouth 2 (Two) Times a Day As Needed.   Past Month at Unknown time   • loratadine (CLARITIN) 10 MG tablet Take 10 mg by mouth Daily.   1/9/2020 at Unknown time   • naproxen sodium (ALEVE) 220 MG tablet Take 220 mg by mouth Every 12 (Twelve) Hours As Needed.   Past Week at Unknown time   • nitrofurantoin, macrocrystal-monohydrate, (MACROBID) 100 MG capsule Take 100 mg by mouth Every Night.   1/8/2020 at 2100   • PARoxetine (PAXIL) 20 MG tablet Take 20 mg by mouth Every Morning.   1/9/2020 at Unknown time   • furosemide (LASIX) 40 MG tablet Take 40 mg by mouth Daily.   4/27/2018 at Unknown time       Allergies:  Patient has no known allergies.    Review of Systems:  fatigue ,,   Negative for following (except as per HPI):  Constitution: chills, fevers,   Eyes: change of vision, loss of vision and discharge  ENT: ear drainage, ear ringing and facial trauma  Respiratory: cough, pleuritic pain, shortness of air  Cardiovascular: chest pressure, pain, lower extremity edema, palpitations  Gastrointestinal: constipation, diarrhea, nausea, vomiting, pain    Integument: rash and wound  Hematologic / Lymphatic: excessive bleeding and easy bruising  Musculoskeletal: joint pain, joint stiffness, joint swelling and muscle pain  Neurological: headaches, numbness, seizures and tremors  Behavioral / Psych: anxiety, depression and hallucinations         Vital Signs  Temp:  [97.7 °F (36.5 °C)-97.9 °F (36.6 °C)] 97.7 °F (36.5 °C)  Heart Rate:  [70-81] 81  Resp:  [16] 16  BP: (144-174)/(72-87) 146/76  Flowsheet Rows      First Filed Value   Admission Height  175.3 cm (69\") Documented at 01/09/2020 1121   Admission Weight  86.2 kg (190 lb) Documented at 01/09/2020 1121         Body mass index is 28.06 kg/m².    Physical Exam:  General Appearance:    Alert, cooperative, in no acute distress   Head:    Normocephalic, without obvious abnormality, atraumatic   Eyes:         " conjunctivae and sclerae normal, no icterus, PERRLA   ENT:    Ears grossly intact, oral mucosa moist,   Neck:   No adenopathy, supple, trachea midline,    Back:     Normal to inspection, range of motion normal   Lungs:     Clear to auscultation,respirations regular, even and                   unlabored    Heart:    Regular rhythm and normal rate,  no murmur, normal S1 and S2,   Abdomen:     Normal bowel sounds, no masses,  soft non-tender, non-distended,    Extremities:   Moves all extremities , no cyanosis, no edema,             Pulses:   Pulses palpable and equal bilaterally   Skin:   No bleeding, rash, bruising    Neurologic:      Psych:   Cranial nerves 2 - 12 grossly intact, sensation grossly intact,     Moves all extremities , right upper extremity with 3 out of 5 strength, 4 out of 5 strength bilateral lower extremity    Alert and Oriented x 3, Normal Affect   LABS:  Admission on 01/09/2020   Component Date Value Ref Range Status   • Glucose 01/09/2020 115* 65 - 99 mg/dL Final   • BUN 01/09/2020 14  8 - 23 mg/dL Final   • Creatinine 01/09/2020 0.61  0.57 - 1.00 mg/dL Final   • Sodium 01/09/2020 139  136 - 145 mmol/L Final   • Potassium 01/09/2020 3.7  3.5 - 5.2 mmol/L Final   • Chloride 01/09/2020 106  98 - 107 mmol/L Final   • CO2 01/09/2020 23.3  22.0 - 29.0 mmol/L Final   • Calcium 01/09/2020 8.1* 8.2 - 9.6 mg/dL Final   • Total Protein 01/09/2020 6.1  6.0 - 8.5 g/dL Final   • Albumin 01/09/2020 3.40* 3.50 - 5.20 g/dL Final   • ALT (SGPT) 01/09/2020 10  1 - 33 U/L Final   • AST (SGOT) 01/09/2020 13  1 - 32 U/L Final   • Alkaline Phosphatase 01/09/2020 86  39 - 117 U/L Final   • Total Bilirubin 01/09/2020 0.5  0.2 - 1.2 mg/dL Final   • eGFR Non African Amer 01/09/2020 92  >60 mL/min/1.73 Final   • Globulin 01/09/2020 2.7  gm/dL Final   • A/G Ratio 01/09/2020 1.3  g/dL Final   • BUN/Creatinine Ratio 01/09/2020 23.0  7.0 - 25.0 Final   • Anion Gap 01/09/2020 9.7  5.0 - 15.0 mmol/L Final   • Troponin T  01/09/2020 <0.010  0.000 - 0.030 ng/mL Final   • Magnesium 01/09/2020 1.8  1.6 - 2.4 mg/dL Final   • Color, UA 01/09/2020 Yellow  Yellow, Straw Final   • Appearance, UA 01/09/2020 Turbid* Clear Final   • pH, UA 01/09/2020 7.0  5.0 - 8.0 Final   • Specific Gravity, UA 01/09/2020 1.016  1.005 - 1.030 Final   • Glucose, UA 01/09/2020 Negative  Negative Final   • Ketones, UA 01/09/2020 Negative  Negative Final   • Bilirubin, UA 01/09/2020 Negative  Negative Final   • Blood, UA 01/09/2020 Moderate (2+)* Negative Final   • Protein, UA 01/09/2020 Negative  Negative Final   • Leuk Esterase, UA 01/09/2020 Large (3+)* Negative Final   • Nitrite, UA 01/09/2020 Negative  Negative Final   • Urobilinogen, UA 01/09/2020 1.0 E.U./dL  0.2 - 1.0 E.U./dL Final   • Extra Tube 01/09/2020 hold for add-on   Final    Auto resulted   • Extra Tube 01/09/2020 Hold for add-ons.   Final    Auto resulted.   • Extra Tube 01/09/2020 hold for add-on   Final    Auto resulted   • Extra Tube 01/09/2020 Hold for add-ons.   Final    Auto resulted.   • WBC 01/09/2020 7.88  3.40 - 10.80 10*3/mm3 Final   • RBC 01/09/2020 4.99  3.77 - 5.28 10*6/mm3 Final   • Hemoglobin 01/09/2020 15.4  12.0 - 15.9 g/dL Final   • Hematocrit 01/09/2020 46.4  34.0 - 46.6 % Final   • MCV 01/09/2020 93.0  79.0 - 97.0 fL Final   • MCH 01/09/2020 30.9  26.6 - 33.0 pg Final   • MCHC 01/09/2020 33.2  31.5 - 35.7 g/dL Final   • RDW 01/09/2020 12.2* 12.3 - 15.4 % Final   • RDW-SD 01/09/2020 41.8  37.0 - 54.0 fl Final   • MPV 01/09/2020 11.1  6.0 - 12.0 fL Final   • Platelets 01/09/2020 212  140 - 450 10*3/mm3 Final   • Neutrophil % 01/09/2020 70.4  42.7 - 76.0 % Final   • Lymphocyte % 01/09/2020 11.0* 19.6 - 45.3 % Final   • Monocyte % 01/09/2020 8.2  5.0 - 12.0 % Final   • Eosinophil % 01/09/2020 9.3* 0.3 - 6.2 % Final   • Basophil % 01/09/2020 0.8  0.0 - 1.5 % Final   • Immature Grans % 01/09/2020 0.3  0.0 - 0.5 % Final   • Neutrophils, Absolute 01/09/2020 5.55  1.70 - 7.00 10*3/mm3  Final   • Lymphocytes, Absolute 01/09/2020 0.87  0.70 - 3.10 10*3/mm3 Final   • Monocytes, Absolute 01/09/2020 0.65  0.10 - 0.90 10*3/mm3 Final   • Eosinophils, Absolute 01/09/2020 0.73* 0.00 - 0.40 10*3/mm3 Final   • Basophils, Absolute 01/09/2020 0.06  0.00 - 0.20 10*3/mm3 Final   • Immature Grans, Absolute 01/09/2020 0.02  0.00 - 0.05 10*3/mm3 Final   • nRBC 01/09/2020 0.0  0.0 - 0.2 /100 WBC Final   • RBC, UA 01/09/2020 6-12* None Seen, 0-2 /HPF Final   • WBC, UA 01/09/2020 Too Numerous to Count* None Seen, 0-2 /HPF Final   • Bacteria, UA 01/09/2020 2+* None Seen /HPF Final   • Squamous Epithelial Cells, UA 01/09/2020 13-20* None Seen, 0-2 /HPF Final   • Hyaline Casts, UA 01/09/2020 3-6  None Seen /LPF Final   • Methodology 01/09/2020 Manual Light Microscopy   Final       DIAGNOSTICS:  Ct Head Without Contrast    Result Date: 1/9/2020  No evidence of acute infarction or hemorrhage. Moderate vascular calcification and moderate-to-severe small vessel ischemic disease is noted. Further evaluation could be performed with MRI examination of the brain as indicated.  The above information was called to and discussed with Dr. Tan.    Radiation dose reduction techniques were utilized, including automated exposure control and exposure modulation based on body size.  This report was finalized on 1/9/2020 4:42 PM by Dr. Tin Arndt M.D.      Mri Brain Without Contrast    Result Date: 1/9/2020  1. Small acute infarct in the left periventricular white matter without hemorrhage or mass effect. 2. Atrophy and extensive chronic-appearing white matter changes. 3. Borderline ventriculomegaly. 4. Paranasal sinus disease as discussed.       Xr Chest 1 View    Result Date: 1/9/2020  FINDINGS AND IMPRESSION: Minimal left basilar atelectasis and or pleural apical scarring is present. No pleural effusion or pneumothorax is seen. The heart is normal in size.  This report was finalized on 1/9/2020 12:59 PM by Dr. Anatoliy Lares M.D.              Results Review:   I reviewed the patient's new clinical results.  Discussed with ER physician  I personally viewed and interpreted the patient's EKG/Telemetry data sinus rhythm with IVCD and occasional PVCs  Old records reviewed previous calcium in June 2019 normal    ASSESSMENT AND PLAN        ·    Acute ischemic stroke  / Weakness of right upper extremity  -CTA still pending  --We will do the further stroke workup and treatment per order set     Aspirin 325mg/ Lipitor 80mg   Hydrate- normal saline    Neurochecks   Check Lipid panel, Hgb A1C   TTE   EKG Tele   PT/OT/ST   Stroke Education      ·  Hypertension  -Although patient has a history of hypertension patient is not on any medications as an outpatient  -We will monitor for now but may need to have blood pressure medications at discharge    ·  Hyperlipidemia  -Patient is on atorvastatin 10 mg but will increase it for now for plaque stabilization  Check lipids in the morning-    ·    UTI (urinary tract infection)  -It appears that patient may have been on Macrobid as an outpatient it is unclear when she started this.  But will give IV Rocephin and continue from the ER  -await culture    · Patient lives at assisted living and will likely need to go to a subacute rehab portion  -PT and OT evaluate and treat as well as care manager      +DVT proph:   + Full code- needs to be clarified     I discussed the patients findings and my recommendations with the patient and/or family.  Please reference all orders placed.    Elham Bruner MD  01/09/20  10:32 PM

## 2020-01-10 NOTE — PROGRESS NOTES
"    Name: Karen Greer ADMIT: 2020   : 8/15/1929  PCP: Atiya Miller MD    MRN: [id number] LOS: 0 days   AGE/SEX: 89 y.o. female  ROOM: [facility]   Subjective   Chief Complaint   Patient presents with   • Weakness - Generalized     [facility] REPORTS PT WITH RIGHT SIDED WEAKNESS AND RIGHT ARM DRIFT; PT WITH GENERALIZED WEAKNESS, NO C/O PAIN     Still with right sided weakness  On asa  bp high    H/o recurrent \"UTIs\"  Pt and family state usually no symptoms  No current  symptoms  Also on chronic macrobid    ROS  No f/c  No n/v  No cp/palp  No soa/cough    Objective   Vital Signs  Temp:  [97.7 °F (36.5 °C)-98.1 °F (36.7 °C)] 98.1 °F (36.7 °C)  Heart Rate:  [76-82] 82  Resp:  [16-18] 18  BP: (140-163)/(72-83) 163/83  SpO2:  [93 %-97 %] 94 %  on   ;   Device (Oxygen Therapy): room air  Body mass index is 28.06 kg/m².    elderly  Physical Exam   HENT:   Head: Normocephalic and atraumatic.   Eyes: Conjunctivae are normal. No scleral icterus.   Neck: Neck supple. No tracheal deviation present.   Cardiovascular: Normal rate and regular rhythm.   Pulmonary/Chest: Effort normal and breath sounds normal.   Abdominal: Soft. There is no tenderness. There is no guarding.   Neurological: She is alert.   Right arm weakness   Skin: Skin is warm and dry.   Psychiatric: She has a normal mood and affect. Her behavior is normal.       Results Review:       I reviewed the patient's new clinical results.  Results from last 7 days   Lab Units 01/10/20  0501 20  1147   WBC 10*3/mm3 7.30 7.88   HEMOGLOBIN g/dL 14.4 15.4   PLATELETS 10*3/mm3 226 212     Results from last 7 days   Lab Units 01/10/20  0501 20  1147   SODIUM mmol/L 135* 139   POTASSIUM mmol/L 3.6 3.7   CHLORIDE mmol/L 102 106   CO2 mmol/L 22.1 23.3   BUN mg/dL 12 14   CREATININE mg/dL 0.59 0.61   GLUCOSE mg/dL 93 115*   Estimated Creatinine Clearance: 54.7 mL/min (by C-G formula based on SCr of 0.59 mg/dL).  Results from last 7 days   Lab Units " 01/09/20  1147   ALBUMIN g/dL 3.40*   BILIRUBIN mg/dL 0.5   ALK PHOS U/L 86   AST (SGOT) U/L 13   ALT (SGPT) U/L 10     Results from last 7 days   Lab Units 01/10/20  0501 01/09/20  1147   CALCIUM mg/dL 8.5 8.1*   ALBUMIN g/dL  --  3.40*   MAGNESIUM mg/dL  --  1.8         Coag     HbA1C   Lab Results   Component Value Date    HGBA1C 6.00 (H) 01/10/2020     Infection     Radiology(recent) Ct Head Without Contrast    Result Date: 1/9/2020  No evidence of acute infarction or hemorrhage. Moderate vascular calcification and moderate-to-severe small vessel ischemic disease is noted. Further evaluation could be performed with MRI examination of the brain as indicated.  The above information was called to and discussed with Dr. Tan.    Radiation dose reduction techniques were utilized, including automated exposure control and exposure modulation based on body size.  This report was finalized on 1/9/2020 4:42 PM by Dr. Tin Arndt M.D.      Ct Angiogram Neck    Result Date: 1/10/2020  1. No evidence of acute infarction or hemorrhage. The acute infarct noted on the MRI examination 01/09/2020 is occult. 2. There is mild atherosclerotic disease involving the carotid bifurcations bilaterally with 0% stenosis using NASCET criteria. There is no evidence of a proximal high-grade intracranial stenosis or aneurysm.    Radiation dose reduction techniques were utilized, including automated exposure control and exposure modulation based on body size.       Mri Brain Without Contrast    Result Date: 1/10/2020  1. Small acute infarct in the left periventricular white matter without hemorrhage or mass effect. 2. Atrophy and extensive chronic-appearing white matter changes. 3. Borderline ventriculomegaly. 4. Paranasal sinus disease as discussed.  This report was finalized on 1/10/2020 5:30 AM by Hung Wilder M.D.      Xr Chest 1 View    Result Date: 1/9/2020  FINDINGS AND IMPRESSION: Minimal left basilar atelectasis and or pleural  apical scarring is present. No pleural effusion or pneumothorax is seen. The heart is normal in size.  This report was finalized on 1/9/2020 12:59 PM by Dr. Anatoliy Lares M.D.      Ct Angiogram Head    Result Date: 1/10/2020  1. No evidence of acute infarction or hemorrhage. The acute infarct noted on the MRI examination 01/09/2020 is occult. 2. There is mild atherosclerotic disease involving the carotid bifurcations bilaterally with 0% stenosis using NASCET criteria. There is no evidence of a proximal high-grade intracranial stenosis or aneurysm.    Radiation dose reduction techniques were utilized, including automated exposure control and exposure modulation based on body size.       Troponin T   Date Value Ref Range Status   01/09/2020 <0.010 0.000 - 0.030 ng/mL Final     No components found for: TSH;2      amLODIPine 5 mg Oral Q24H   aspirin 325 mg Oral Daily   Or      aspirin 300 mg Rectal Daily   atorvastatin 80 mg Oral Daily   cetirizine 5 mg Oral Daily   diclofenac 4 g Topical 4x Daily   PARoxetine 20 mg Oral QAM   sodium chloride 10 mL Intravenous Q12H       sodium chloride 0.9 % with KCl 20 mEq 75 mL/hr Last Rate: 75 mL/hr (01/10/20 1304)   Diet Regular      Assessment/Plan      Active Hospital Problems    Diagnosis  POA   • **Acute ischemic stroke  [I63.9]  Yes   • Weakness of right upper extremity [R29.898]  Yes   • Asymptomatic bacteriuria [R82.71]  Yes   • Hypertension [I10]  Yes   • Hyperlipidemia [E78.5]  Yes      Resolved Hospital Problems   No resolved problems to display.     89 y.o. with left subcortical stroke most likely related to hypertensive small vessel disease    -PT/OT/ST  -Follow ECHO  -CTA ok  -ASA and lipitor  -Goal BP <130/80. Add amlodipine  -Follow up Neurology in 2-3 months  -was on ceftriaxone for possible UTI but d/w pt and family she is not having any symptoms so will stop ABX. I don't think the prophylactic macrobid is a great idea but can defer that to her outpatient  physicians.       MARTÍN RN  DW family  Greater than 36 minutes spent with greater than 50% counseling and coordinating care      Shamir Calderon MD  [facility]  01/10/20  1:15 PM

## 2020-01-10 NOTE — DISCHARGE PLACEMENT REQUEST
"Jaime Greer (89 y.o. Female)     Date of Birth Social Security Number Address Home Phone MRN    08/15/1929  [address]   [facility] [place] [address] [phone] [id number]    Jain Marital Status          None Single       Admission Date Admission Type Admitting Provider Attending Provider Department, Room/Bed    1/9/20 Emergency Elham Bruner MD Jackson, Alan David, MD [address], [address]    Discharge Date Discharge Disposition Discharge Destination                       Attending Provider:  Shamir Calderon MD    Allergies:  No Known Allergies    Isolation:  None   Infection:  None   Code Status:  CPR    Ht:  175.3 cm (69\")   Wt:  86.2 kg (190 lb 0.6 oz)    Admission Cmt:  None   Principal Problem:  Acute ischemic stroke  [I63.9]                 Active Insurance as of 1/9/2020     Primary Coverage     Payor Plan Insurance Group Employer/Plan Group    MEDICARE MEDICARE A & B      Payor Plan Address Payor Plan Phone Number Payor Plan Fax Number Effective Dates    [address] [phone]  8/1/1994 - None Entered    [facility] [id number]       Subscriber Name Subscriber Birth Date Member ID       JAIME GREER 8/15/1929 [id number]           Secondary Coverage     Payor Plan Insurance Group Employer/Plan Group    [facility] [id number]     Payor Plan Address Payor Plan Phone Number Payor Plan Fax Number Effective Dates    [address]   1/1/2017 - None Entered    [facility] [address]       Subscriber Name Subscriber Birth Date Member ID       JAIME GREER 8/15/1929 [id number]                 Emergency Contacts      (Rel.) Home Phone Work Phone Mobile Phone    Lee Greer (Son) -- -- [phone]              "

## 2020-01-10 NOTE — PLAN OF CARE
Problem: Patient Care Overview  Goal: Plan of Care Review  1/10/2020 0549 by Bipin Preston RN  Outcome: Ongoing (interventions implemented as appropriate)  Flowsheets (Taken 1/10/2020 0549)  Progress: no change  Plan of Care Reviewed With: patient  Outcome Summary: Patient rested quietly all night, as it got later into the shiftshe started to show signs of becoming confused.

## 2020-01-10 NOTE — PLAN OF CARE
Problem: Patient Care Overview  Goal: Plan of Care Review  Outcome: Ongoing (interventions implemented as appropriate)  Flowsheets (Taken 1/10/2020 1441)  Progress: improving  Plan of Care Reviewed With: patient; family  Outcome Summary: Pt exhibits a functional oropharyngeal swallow. SLP recs regular and thins, will sign off. No dysarthria appreciated this date.

## 2020-01-10 NOTE — CONSULTS
NEUROLOGY CONSULT - STROKE TEAM    DOS: 1/10/2020  NAME: Karen Greer   : 8/15/1929  PCP: Atiya Miller MD  CC: Stroke  Referring MD: Elham Bruner,*  Patient being seen at request of Dr. Bruner for advice and opinion on stroke.    Neurological Problem and Interval History:  89 y.o. female presents with chief complaint of: right sided weakness. Patient has a history of hypertension and hyperlipidemia but doesn't have her blood pressure checked very often. She awoke yesterday to find that her right arm and leg were weak and she was leaning to the right side. She also couldn't hold her utensils correctly and was brought to ED in the evening. Out of tPA window.  In hospital, patient has had some elevated blood pressures ranging from 140s to 174 and 72 to 87.      Past Medical/Surgical Hx:  Past Medical History:   Diagnosis Date   • Depression    • Edema    • Hyperlipidemia    • Hypertension    • Other abnormalities of gait and mobility    • Personal history of urinary (tract) infection    • Repeated falls      Past Surgical History:   Procedure Laterality Date   • APPENDECTOMY     • TONSILLECTOMY         Review of Systems:        No fever, sweats or chills,  No neck pain, back pain or joint pain  No loss of hearing or tinnitus  No blurry or double vision  No rashes or edema  No chest pain or palpitations  No shortness of breath or wheezing  No diarrhea or constipation  No urinary incontinence or dysuria  No seizures or syncope  No depression or anxiety    Medications On Admission  Medications Prior to Admission   Medication Sig Dispense Refill Last Dose   • acetaminophen (TYLENOL) 500 MG tablet Take 500 mg by mouth Every 6 (Six) Hours As Needed for Mild Pain .   Past Month at Unknown time   • atorvastatin (LIPITOR) 10 MG tablet Take 10 mg by mouth Daily.   2020 at Unknown time   • diclofenac (VOLTAREN) 1 % gel gel Apply 4 g topically to the appropriate area as directed 4 (Four) Times a Day.    1/8/2020 at Unknown time   • guaiFENesin (MUCINEX) 600 MG 12 hr tablet Take 1 tablet by mouth 2 (Two) Times a Day As Needed.   Past Month at Unknown time   • loratadine (CLARITIN) 10 MG tablet Take 10 mg by mouth Daily.   1/9/2020 at Unknown time   • naproxen sodium (ALEVE) 220 MG tablet Take 220 mg by mouth Every 12 (Twelve) Hours As Needed.   Past Week at Unknown time   • nitrofurantoin, macrocrystal-monohydrate, (MACROBID) 100 MG capsule Take 100 mg by mouth Every Night.   1/8/2020 at 2100   • PARoxetine (PAXIL) 20 MG tablet Take 20 mg by mouth Every Morning.   1/9/2020 at Unknown time   • furosemide (LASIX) 40 MG tablet Take 40 mg by mouth Daily.   4/27/2018 at Unknown time       Allergies:  No Known Allergies    Social Hx:  Social History     Socioeconomic History   • Marital status: Single     Spouse name: Not on file   • Number of children: Not on file   • Years of education: Not on file   • Highest education level: Not on file   Tobacco Use   • Smoking status: Former Smoker   • Smokeless tobacco: Never Used   Substance and Sexual Activity   • Alcohol use: Yes     Comment: occ   • Drug use: Never       Family Hx:  History reviewed. No pertinent family history.    Review of Imaging (Interpretation of images not reports):      Laboratory Results:   Lab Results   Component Value Date    GLUCOSE 93 01/10/2020    CALCIUM 8.5 01/10/2020     (L) 01/10/2020    K 3.6 01/10/2020    CO2 22.1 01/10/2020     01/10/2020    BUN 12 01/10/2020    CREATININE 0.59 01/10/2020    EGFRIFNONA 96 01/10/2020    BCR 20.3 01/10/2020    ANIONGAP 10.9 01/10/2020     Lab Results   Component Value Date    WBC 7.30 01/10/2020    HGB 14.4 01/10/2020    HCT 42.2 01/10/2020    MCV 91.9 01/10/2020     01/10/2020     Lab Results   Component Value Date    CHOL 127 01/10/2020     Lab Results   Component Value Date    HDL 57 01/10/2020     Lab Results   Component Value Date    LDL 55 01/10/2020     Lab Results   Component Value  "Date    TRIG 74 01/10/2020     Lab Results   Component Value Date    HGBA1C 6.00 (H) 01/10/2020     No results found for: INR, PROTIME      Physical Examination:   /83   Pulse 82   Temp 98.1 °F (36.7 °C) (Oral)   Resp 18   Ht 175.3 cm (69\")   Wt 86.2 kg (190 lb 0.6 oz)   SpO2 94%   BMI 28.06 kg/m²   General Appearance:   Well developed, well nourished, well groomed, alert, and cooperative.  HEENT: Normocephalic. Atraumatic. No JVD, no tracheal deviation.  Neck and Spine: Normal range of motion.  Normal alignment. No mass or tenderness. No bruits.  Cardiac: Regular rate and rhythm. No murmurs.  Pulmonary: No shortness of breath, clear anteriorly to auscultation  Abdomen:  Soft, non-tender, non-distended   Peripheral Vasculature: Radial pulses are equal and symmetric. No signs of distal embolization.  Extremities:    No edema or deformities.   Skin:    No rashes or discoloration    Neurological examination:  Higher Integrative  Function: Oriented to time, place and person. Normal registration, recall, attention span and concentration. Normal language including comprehension, spontaneous speech, repetition, naming and vocabulary. No neglect. Normal fund of knowledge and higher integrative function. Positive palmomentalis bilaterally. Subtle right grasp reflex.   CN II: Pupils are equal, round, and reactive to light. Blinks to visual threat and counts fingers in all fields  CN III IV VI: Extraocular movements are full without nystagmus.   CN V: Normal facial sensation   CN VII: Right nasolabial fold flattening, subtle labial dysarthria.   CN VIII:   Hard of hearing  CN IX & X:   No palatal or pharnygeal dysarthria.  CN XI: Turns head without abnormality.   CN XII:   The tongue is midline.  No lingual dysarthria.   Motor: Right arm pronator drift, slower finger tapping and hand tapping. Can just overcome patient's push but strong pull. Grasp and finger extension subtly weaker. Able to overcome right hip " flexor and right ankle dorsiflexion but able to give good strength.   Reflexes: 1+ in the upper and lower extremities except absent ankle jerks. Plantar responses are flexor.  Sensation: Normal to light touch, temperature, and proprioception in arms and legs.   Station and Gait: Deferred for bed rest    Coordination: Finger to nose test shows no dysmetria.  Rapid alternating movements are normal.  Heel to shin normal.    Diagnoses / Discussion:  89 y.o. who presents with Sx of left subcortical stroke most likely related to hypertensive small vessel disease. Awaiting read on CTA but no large artery occlusion identified by my read. Echocardiogram ordered and patient on aspirin and lipitor. Agree with that regimen as well as blood pressure treatment to <130/80.    Plan:  PT/OT for disposition  Follow-up with neuro/stroke in 2-3 months  Diagnostic and management plan complete other than echocardiogram. Will sign off but please feel free to recontact with any questions or concerns.      Call 911 for stroke any stroke symptoms    I have discussed the above with the patient and family.  Time spent with patient: 40min    MDM  Reviewed: previous chart, vitals and nursing note  Reviewed previous: labs, CT scan and MRI  Interpretation: CT scan, MRI and labs  Total time providing critical care: 30-74 minutes. This excludes time spent performing separately reportable procedures and services.         Jamil Griffiths MD  Neurology

## 2020-01-10 NOTE — PLAN OF CARE
Problem: Patient Care Overview  Goal: Plan of Care Review  Flowsheets (Taken 1/10/2020 1978)  Plan of Care Reviewed With: patient  Outcome Summary: Pt admit 2/2 rt sided weakness found with left subcortical stroke. Pt presents with mild rt sided weakness and rt sided fine motor deficits. Pt was baseline leandro lift and w/c bound and requires 2P assist for commode transfer. Pt would benefit form skilled OT to address UE deficits and self care. Will continue to follow. Pt may benefit from EILEEN at d/c.

## 2020-01-10 NOTE — THERAPY EVALUATION
Acute Care - Occupational Therapy Initial Evaluation  [facility]     Patient Name: Karen Greer  : 8/15/1929  MRN: [id number]  Today's Date: 1/10/2020             Admit Date: 2020       ICD-10-CM ICD-9-CM   1. Weakness of right upper extremity R29.898 729.89   2. Hypertension, unspecified type I10 401.9   3. Urinary tract infection in female N39.0 599.0     Patient Active Problem List   Diagnosis   • Weakness of right upper extremity   • Asymptomatic bacteriuria   • Hypertension   • Hyperlipidemia   • Acute ischemic stroke      Past Medical History:   Diagnosis Date   • Depression    • Edema    • Hyperlipidemia    • Hypertension    • Other abnormalities of gait and mobility    • Personal history of urinary (tract) infection    • Repeated falls      Past Surgical History:   Procedure Laterality Date   • APPENDECTOMY     • TONSILLECTOMY            OT ASSESSMENT FLOWSHEET (last 12 hours)      Occupational Therapy Evaluation     Row Name 01/10/20 1431                   OT Evaluation Time/Intention    Subjective Information  no complaints  -        Document Type  evaluation  -        Mode of Treatment  individual therapy;occupational therapy  -        Patient Effort  good  -        Symptoms Noted During/After Treatment  fatigue  -           General Information    Patient Profile Reviewed?  yes  -        Patient Observations  alert;cooperative;agree to therapy  -        Patient/Family Observations  -- daughter at bedside   -        General Observations of Patient  -- pt alert but lateral rt lean in bed, repositioned   -        Prior Level of Function  max assist:;ADL's  -        Equipment Currently Used at Home  wheelchair  -        Existing Precautions/Restrictions  fall  -           Cognitive Assessment/Intervention- PT/OT    Orientation Status (Cognition)  oriented to;person;place  -        Follows Commands (Cognition)  WNL  -        Safety Deficit (Cognitive)  mild  deficit;insight into deficits/self awareness;safety precautions awareness  -           Safety Issues, Functional Mobility    Safety Issues Affecting Function (Mobility)  awareness of need for assistance;insight into deficits/self awareness  -        Impairments Affecting Function (Mobility)  cognition;strength  -           Bed Mobility Assessment/Treatment    Comment (Bed Mobility)  -- 2p assist req'd.   -           ADL Assessment/Intervention    BADL Assessment/Intervention  grooming;feeding  -           Grooming Assessment/Training    [place] Level (Grooming)  wash face, hands;set up  -        Grooming Position  supine  -           Self-Feeding Assessment/Training    [place] Level (Feeding)  scoop food and bring to mouth;minimum assist (75% patient effort);verbal cues  -        Position (Self-Feeding)  supine supported   -        Comment (Feeding)  -- pt given built up handles for improved self feeding   -           BADL Safety/Performance    Impairments, BADL Safety/Performance  cognition;endurance/activity tolerance;strength;range of motion;motor planning;motor control  -        Cognitive Impairments, BADL Safety/Performance  judgment;problem solving/reasoning  -        Skilled BADL Treatment/Intervention  adaptive equipment training  -           General ROM    GENERAL ROM COMMENTS  -- lft UE WFL, rt WFL but poor UE motor control  -           MMT (Manual Muscle Testing)    General MMT Comments  -- 4/5 grossly B UE  -           Motor Assessment/Interventions    Additional Documentation  Therapeutic Exercise Interventions (Group);Therapeutic Exercise (Group)  -           Therapeutic Exercise    Hand (Therapeutic Exercise)  finger flexion/extension, right;thumb finger opposition, bilateral;hand , bilateral  -        Comment (Therapeutic Exercise)  -- pt given sponge for hand  strengthing   -           Sensory Assessment/Intervention    Sensory General Assessment   no sensation deficits identified  -           Positioning and Restraints    Pre-Treatment Position  in bed  -        Post Treatment Position  bed  -        In Bed  supine;exit alarm on;patient within staff view;with family/caregiver  -           Pain Assessment    Additional Documentation  Pain Scale: Numbers Pre/Post-Treatment (Group)  -           Pain Scale: Numbers Pre/Post-Treatment    Pain Scale: Numbers, Pretreatment  0/10 - no pain  -        Pain Scale: Numbers, Post-Treatment  0/10 - no pain  -           Coping    Observed Emotional State  accepting;cooperative;calm  -        Verbalized Emotional State  acceptance  -           Plan of Care Review    Plan of Care Reviewed With  patient;daughter  -           Clinical Impression (OT)    Criteria for Skilled Therapeutic Interventions Met (OT Eval)  yes;treatment indicated  -        Rehab Potential (OT Eval)  good, to achieve stated therapy goals  -        Therapy Frequency (OT Eval)  5 times/wk  -        Care Plan Review (OT)  evaluation/treatment results reviewed;patient/other agree to care plan  -        Anticipated Discharge Disposition (OT)  skilled nursing facility  -           Vital Signs    O2 Delivery Pre Treatment  room air  -        O2 Delivery Post Treatment  room air  -           OT Goals    Toileting Goal Selection (OT)  --  -        Grooming Goal Selection (OT)  grooming, OT goal 1  -        Strength Goal Selection (OT)  strength, OT goal 1  -        Additional Documentation  Grooming Goal Selection (OT) (Row);Strength Goal Selection (OT) (Row)  -           Grooming Goal 1 (OT)    Activity/Device (Grooming Goal 1, OT)  grooming skills, all  -        [place] (Grooming Goal 1, OT)  minimum assist (75% or more patient effort);verbal cues required  -        Time Frame (Grooming Goal 1, OT)  short term goal (STG);1 week  -           Strength Goal 1 (OT)    Strength Goal 1 (OT)  -- Pt will complete UE  HEP for 10 reps to increase strength  -        Time Frame (Strength Goal 1, OT)  short term goal (STG);1 week  -          User Key  (r) = Recorded By, (t) = Taken By, (c) = Cosigned By    Initials Name Effective Dates    Michael Oneill OT 09/04/19 -                OT Recommendation and Plan  Outcome Summary/Treatment Plan (OT)  Anticipated Discharge Disposition (OT): skilled nursing facility  Therapy Frequency (OT Eval): 5 times/wk  Plan of Care Review  Plan of Care Reviewed With: patient  Plan of Care Reviewed With: patient  Outcome Summary: Pt admit 2/2 rt sided weakness found with left subcortical stroke. Pt presents with mild rt sided weakness and rt sided fine motor deficits. Pt was baseline leandro lift and w/c bound and requires 2P assist for commode transfer. Pt would benefit form skilled OT to address UE deficits and self care. Will continue to follow. Pt may benefit from EILEEN at d/c.    Outcome Measures     Row Name 01/10/20 1500             How much help from another is currently needed...    Putting on and taking off regular lower body clothing?  1  -JH      Bathing (including washing, rinsing, and drying)  1  -JH      Toileting (which includes using toilet bed pan or urinal)  1  -      Putting on and taking off regular upper body clothing  2  -      Taking care of personal grooming (such as brushing teeth)  2  -      Eating meals  2  -      AM-PAC 6 Clicks Score (OT)  9  -         Modified [place] Scale    Modified Domenico Scale  3 - Moderate disability.  Requiring some help, but able to walk without assistance.  -         Functional Assessment    Outcome Measure Options  AM-PAC 6 Clicks Daily Activity (OT);Modified Domenico  -        User Key  (r) = Recorded By, (t) = Taken By, (c) = Cosigned By    Initials Name Provider Type    Michael Oneill OT Occupational Therapist          Time Calculation:   Time Calculation- OT     Row Name 01/10/20 1508             Time Calculation- OT    OT  Start Time  1345  -      OT Stop Time  1415  -      OT Time Calculation (min)  30 min  -      Total Timed Code Minutes- OT  30 minute(s)  -      OT Received On  01/10/20  -        User Key  (r) = Recorded By, (t) = Taken By, (c) = Cosigned By    Initials Name Provider Type     Michael Duarte OT Occupational Therapist        Therapy Charges for Today     Code Description Service Date Service Provider Modifiers Qty    [id number]  OT EVAL MOD COMPLEXITY 2 1/10/2020 Michael Duarte OT GO 1    [id number]  OT SELF CARE/MGMT/TRAIN EA 15 MIN 1/10/2020 Michael Duarte OT GO 2               Michael Duarte OT  1/10/2020

## 2020-01-11 LAB
ANION GAP SERPL CALCULATED.3IONS-SCNC: 13.8 MMOL/L (ref 5–15)
BASOPHILS # BLD AUTO: 0.06 10*3/MM3 (ref 0–0.2)
BASOPHILS NFR BLD AUTO: 0.8 % (ref 0–1.5)
BUN BLD-MCNC: 10 MG/DL (ref 8–23)
BUN/CREAT SERPL: 16.9 (ref 7–25)
CALCIUM SPEC-SCNC: 8.5 MG/DL (ref 8.2–9.6)
CHLORIDE SERPL-SCNC: 106 MMOL/L (ref 98–107)
CO2 SERPL-SCNC: 20.2 MMOL/L (ref 22–29)
CREAT BLD-MCNC: 0.59 MG/DL (ref 0.57–1)
DEPRECATED RDW RBC AUTO: 38.8 FL (ref 37–54)
EOSINOPHIL # BLD AUTO: 0.53 10*3/MM3 (ref 0–0.4)
EOSINOPHIL NFR BLD AUTO: 7.5 % (ref 0.3–6.2)
ERYTHROCYTE [DISTWIDTH] IN BLOOD BY AUTOMATED COUNT: 11.9 % (ref 12.3–15.4)
GFR SERPL CREATININE-BSD FRML MDRD: 96 ML/MIN/1.73
GLUCOSE BLD-MCNC: 102 MG/DL (ref 65–99)
HCT VFR BLD AUTO: 41.2 % (ref 34–46.6)
HGB BLD-MCNC: 14.1 G/DL (ref 12–15.9)
IMM GRANULOCYTES # BLD AUTO: 0.02 10*3/MM3 (ref 0–0.05)
IMM GRANULOCYTES NFR BLD AUTO: 0.3 % (ref 0–0.5)
LYMPHOCYTES # BLD AUTO: 1.08 10*3/MM3 (ref 0.7–3.1)
LYMPHOCYTES NFR BLD AUTO: 15.2 % (ref 19.6–45.3)
MCH RBC QN AUTO: 30.9 PG (ref 26.6–33)
MCHC RBC AUTO-ENTMCNC: 34.2 G/DL (ref 31.5–35.7)
MCV RBC AUTO: 90.4 FL (ref 79–97)
MONOCYTES # BLD AUTO: 0.82 10*3/MM3 (ref 0.1–0.9)
MONOCYTES NFR BLD AUTO: 11.6 % (ref 5–12)
NEUTROPHILS # BLD AUTO: 4.58 10*3/MM3 (ref 1.7–7)
NEUTROPHILS NFR BLD AUTO: 64.6 % (ref 42.7–76)
NRBC BLD AUTO-RTO: 0 /100 WBC (ref 0–0.2)
PLATELET # BLD AUTO: 201 10*3/MM3 (ref 140–450)
PMV BLD AUTO: 11.1 FL (ref 6–12)
POTASSIUM BLD-SCNC: 3.9 MMOL/L (ref 3.5–5.2)
RBC # BLD AUTO: 4.56 10*6/MM3 (ref 3.77–5.28)
SODIUM BLD-SCNC: 140 MMOL/L (ref 136–145)
WBC NRBC COR # BLD: 7.09 10*3/MM3 (ref 3.4–10.8)

## 2020-01-11 PROCEDURE — 85025 COMPLETE CBC W/AUTO DIFF WBC: CPT | Performed by: INTERNAL MEDICINE

## 2020-01-11 PROCEDURE — 80048 BASIC METABOLIC PNL TOTAL CA: CPT | Performed by: INTERNAL MEDICINE

## 2020-01-11 PROCEDURE — 25810000003 SODIUM CHLORIDE 0.9 % WITH KCL 20 MEQ 20-0.9 MEQ/L-% SOLUTION: Performed by: INTERNAL MEDICINE

## 2020-01-11 RX ORDER — AMLODIPINE BESYLATE 2.5 MG/1
2.5 TABLET ORAL ONCE
Status: COMPLETED | OUTPATIENT
Start: 2020-01-11 | End: 2020-01-11

## 2020-01-11 RX ORDER — AMLODIPINE BESYLATE 10 MG/1
10 TABLET ORAL
Status: DISCONTINUED | OUTPATIENT
Start: 2020-01-12 | End: 2020-01-12 | Stop reason: HOSPADM

## 2020-01-11 RX ADMIN — DICLOFENAC SODIUM 4 G: 10 GEL TOPICAL at 09:46

## 2020-01-11 RX ADMIN — CETIRIZINE HYDROCHLORIDE 5 MG: 10 TABLET, FILM COATED ORAL at 09:44

## 2020-01-11 RX ADMIN — SODIUM CHLORIDE, PRESERVATIVE FREE 10 ML: 5 INJECTION INTRAVENOUS at 09:47

## 2020-01-11 RX ADMIN — SODIUM CHLORIDE, PRESERVATIVE FREE 10 ML: 5 INJECTION INTRAVENOUS at 20:09

## 2020-01-11 RX ADMIN — AMLODIPINE BESYLATE 5 MG: 5 TABLET ORAL at 09:44

## 2020-01-11 RX ADMIN — DICLOFENAC SODIUM 4 G: 10 GEL TOPICAL at 18:27

## 2020-01-11 RX ADMIN — ASPIRIN 325 MG: 325 TABLET ORAL at 09:44

## 2020-01-11 RX ADMIN — POTASSIUM CHLORIDE AND SODIUM CHLORIDE 75 ML/HR: 900; 150 INJECTION, SOLUTION INTRAVENOUS at 05:29

## 2020-01-11 RX ADMIN — PAROXETINE HYDROCHLORIDE 20 MG: 20 TABLET, FILM COATED ORAL at 06:38

## 2020-01-11 RX ADMIN — ATORVASTATIN CALCIUM 80 MG: 80 TABLET, FILM COATED ORAL at 09:44

## 2020-01-11 RX ADMIN — AMLODIPINE BESYLATE 2.5 MG: 2.5 TABLET ORAL at 18:29

## 2020-01-11 RX ADMIN — DICLOFENAC SODIUM 4 G: 10 GEL TOPICAL at 20:09

## 2020-01-11 RX ADMIN — DICLOFENAC SODIUM 4 G: 10 GEL TOPICAL at 12:48

## 2020-01-11 NOTE — PROGRESS NOTES
"    Name: Karen Greer ADMIT: 2020   : 8/15/1929  PCP: Atiya Miller MD    MRN: [id number] LOS: 1 days   AGE/SEX: 89 y.o. female  ROOM: [facility]   Subjective   Chief Complaint   Patient presents with   • Weakness - Generalized     [facility] REPORTS PT WITH RIGHT SIDED WEAKNESS AND RIGHT ARM DRIFT; PT WITH GENERALIZED WEAKNESS, NO C/O PAIN     Still with right sided weakness  Better than yesterday  Worked with therapist        H/o recurrent \"UTIs\"  Pt and family state usually no symptoms  No current  symptoms  Also on chronic macrobid    ROS  No f/c  No n/v  No cp/palp  No soa/cough    Objective   Vital Signs  Temp:  [97.9 °F (36.6 °C)-98.3 °F (36.8 °C)] 98.2 °F (36.8 °C)  Heart Rate:  [75-84] 75  Resp:  [18-20] 18  BP: (139-157)/(66-75) 139/68  SpO2:  [92 %-93 %] 92 %  on   ;   Device (Oxygen Therapy): room air  Body mass index is 27.67 kg/m².    elderly  Physical Exam   HENT:   Head: Normocephalic and atraumatic.   Eyes: Conjunctivae are normal. No scleral icterus.   Neck: Neck supple. No tracheal deviation present.   Cardiovascular: Normal rate and regular rhythm.   Pulmonary/Chest: Effort normal and breath sounds normal.   Abdominal: Soft. There is no tenderness. There is no guarding.   Neurological: She is alert.   Right arm weakness   Skin: Skin is warm and dry.   Psychiatric: She has a normal mood and affect. Her behavior is normal.       Results Review:       I reviewed the patient's new clinical results.  Results from last 7 days   Lab Units 20  0514 01/10/20  0501 20  1147   WBC 10*3/mm3 7.09 7.30 7.88   HEMOGLOBIN g/dL 14.1 14.4 15.4   PLATELETS 10*3/mm3 201 226 212     Results from last 7 days   Lab Units 20  0514 01/10/20  0501 20  1147   SODIUM mmol/L 140 135* 139   POTASSIUM mmol/L 3.9 3.6 3.7   CHLORIDE mmol/L 106 102 106   CO2 mmol/L 20.2* 22.1 23.3   BUN mg/dL 10 12 14   CREATININE mg/dL 0.59 0.59 0.61   GLUCOSE mg/dL 102* 93 115*   Estimated " Creatinine Clearance: 54.4 mL/min (by C-G formula based on SCr of 0.59 mg/dL).  Results from last 7 days   Lab Units 01/09/20  1147   ALBUMIN g/dL 3.40*   BILIRUBIN mg/dL 0.5   ALK PHOS U/L 86   AST (SGOT) U/L 13   ALT (SGPT) U/L 10     Results from last 7 days   Lab Units 01/11/20  0514 01/10/20  0501 01/09/20  1147   CALCIUM mg/dL 8.5 8.5 8.1*   ALBUMIN g/dL  --   --  3.40*   MAGNESIUM mg/dL  --   --  1.8         Coag     HbA1C   Lab Results   Component Value Date    HGBA1C 6.00 (H) 01/10/2020     Infection   Results from last 7 days   Lab Units 01/09/20  1303   URINECX  >100,000 CFU/mL Gram Negative Bacilli*     Radiology(recent) Ct Angiogram Neck    Result Date: 1/10/2020  1. No evidence of acute infarction or hemorrhage. The acute infarct noted on the MRI examination 01/09/2020 is occult. 2. There is mild atherosclerotic disease involving the carotid bifurcations bilaterally with 0% stenosis using NASCET criteria. There is no evidence of a proximal high-grade intracranial stenosis or aneurysm.    Radiation dose reduction techniques were utilized, including automated exposure control and exposure modulation based on body size.  This report was finalized on 1/10/2020 4:29 PM by Dr. Tin Arndt M.D.      Mri Brain Without Contrast    Result Date: 1/10/2020  1. Small acute infarct in the left periventricular white matter without hemorrhage or mass effect. 2. Atrophy and extensive chronic-appearing white matter changes. 3. Borderline ventriculomegaly. 4. Paranasal sinus disease as discussed.  This report was finalized on 1/10/2020 5:30 AM by Hung Wilder M.D.      Ct Angiogram Head    Result Date: 1/10/2020  1. No evidence of acute infarction or hemorrhage. The acute infarct noted on the MRI examination 01/09/2020 is occult. 2. There is mild atherosclerotic disease involving the carotid bifurcations bilaterally with 0% stenosis using NASCET criteria. There is no evidence of a proximal high-grade intracranial  stenosis or aneurysm.    Radiation dose reduction techniques were utilized, including automated exposure control and exposure modulation based on body size.  This report was finalized on 1/10/2020 4:29 PM by Dr. Tin Arndt M.D.      Troponin T   Date Value Ref Range Status   01/09/2020 <0.010 0.000 - 0.030 ng/mL Final     No components found for: TSH;2      [START ON 1/12/2020] amLODIPine 10 mg Oral Q24H   amLODIPine 2.5 mg Oral Once   aspirin 325 mg Oral Daily   Or      aspirin 300 mg Rectal Daily   atorvastatin 80 mg Oral Daily   cetirizine 5 mg Oral Daily   diclofenac 4 g Topical 4x Daily   PARoxetine 20 mg Oral QAM   sodium chloride 10 mL Intravenous Q12H      Diet Regular      Assessment/Plan      Active Hospital Problems    Diagnosis  POA   • **Acute ischemic stroke  [I63.9]  Yes   • Weakness of right upper extremity [R29.898]  Yes   • Asymptomatic bacteriuria [R82.71]  Yes   • Hypertension [I10]  Yes   • Hyperlipidemia [E78.5]  Yes      Resolved Hospital Problems   No resolved problems to display.     89 y.o. with left subcortical stroke most likely related to hypertensive small vessel disease    -PT/OT/ST  -ECHO- EF 63%, normal.   -CTA ok  -ASA and lipitor  -Goal BP <130/80. increaseamlodipine  -Follow up Neurology in 2-3 months  -was on ceftriaxone for possible UTI but d/w pt and family she is not having any symptoms so will stop ABX. I don't think the prophylactic macrobid is a great idea but can defer that to her outpatient physicians.     Continue above, plan on discharge to her facility tomorrow on 1/12      Shamir Calderon MD  [facility]  01/11/20  1:15 PM

## 2020-01-11 NOTE — PLAN OF CARE
Pt has been alert and oriented today with no confusion noted.  Pt talked. Pt to be discharged on 1/12/2020 Gauri OWENS is taking care of arrangements.  Family needs to be called on about this.  Hearing aid found by aid son has other one.  VS stable will continue to monitor.       Problem: Patient Care Overview  Goal: Plan of Care Review  Outcome: Ongoing (interventions implemented as appropriate)  Flowsheets (Taken 1/11/2020 1728)  Progress: improving  Plan of Care Reviewed With: patient; son  Goal: Individualization and Mutuality  Outcome: Ongoing (interventions implemented as appropriate)  Goal: Discharge Needs Assessment  Outcome: Ongoing (interventions implemented as appropriate)  Goal: Interprofessional Rounds/Family Conf  Outcome: Ongoing (interventions implemented as appropriate)     Problem: Fall Risk (Adult)  Goal: Absence of Fall  Outcome: Ongoing (interventions implemented as appropriate)     Problem: Activity Intolerance (Adult)  Goal: Identify Related Risk Factors and Signs and Symptoms  Outcome: Ongoing (interventions implemented as appropriate)  Goal: Activity Tolerance  Outcome: Ongoing (interventions implemented as appropriate)  Goal: Effective Energy Conservation Techniques  Outcome: Ongoing (interventions implemented as appropriate)     Problem: Urinary Tract Infection (Adult)  Goal: Signs and Symptoms of Listed Potential Problems Will be Absent, Minimized or Managed (Urinary Tract Infection)  Outcome: Ongoing (interventions implemented as appropriate)     Problem: Skin Injury Risk (Adult)  Goal: Identify Related Risk Factors and Signs and Symptoms  Outcome: Ongoing (interventions implemented as appropriate)  Goal: Skin Health and Integrity  Outcome: Ongoing (interventions implemented as appropriate)     Problem: Stroke (Ischemic) (Adult)  Goal: Signs and Symptoms of Listed Potential Problems Will be Absent, Minimized or Managed (Stroke)  Outcome: Ongoing (interventions implemented as  appropriate)

## 2020-01-11 NOTE — PROGRESS NOTES
Continued Stay Note  [facility]     Patient Name: Karen Greer  MRN: [id number]  Today's Date: 1/11/2020    Admit Date: 1/9/2020    Discharge Plan     Row Name 01/11/20 1815       Plan    Plan  [facility],  bed. Anticipate DC Sunday via WC Van- Yellow  Van......................Lo STEVENSON     Patient/Family in Agreement with Plan  yes    Plan Comments  Call from RN stating anticipate DC tomorrow and [facility] and [facility] neither can transport before Monday. S/W Patricia/[facility] who states patient transports via WC van with them and is in a WC the majority of the time. Patricia states patient can return tomorrow if transport arrangements can be made. S/W Phyllis/ CCP Supervisor, states send patient in one of the hospital's WC and staff will go retrieve this from [facility] Monday. S/W alessandro Howard who is in agreement with [facility]  [facility]. S/W [facility]  Van and arrangements made for 2pm pick-up tomorrow. Alessandro Howard and Patricia/Celso all aware of time for pick-up and all in agreement with plan. Update to RN that patient will go in our WC and needs to be at Main Entrance of hospital for pick-up at 2pm tomorrow in WC. CCP will follow and assist as needed...........Lo STEVENSON         Discharge Codes    No documentation.             Gauri Gregory, RN

## 2020-01-11 NOTE — PLAN OF CARE
Problem: Patient Care Overview  Goal: Plan of Care Review  1/11/2020 0524 by Bipin Preston RN  Outcome: Ongoing (interventions implemented as appropriate)  Flowsheets (Taken 1/11/2020 0524)  Progress: improving  Plan of Care Reviewed With: patient  Outcome Summary: Patient denies pain, rested comfortably through the night. Mild confusion noted, reorients easily.

## 2020-01-11 NOTE — NURSING NOTE
Deon at 1617 stated they can NOT  patient on 1/12/2020.  Called and spoke to [facility], they will  patient on 1/13/2020.  Note sent to doctor Kvng.  On coming nurse is aware as well as family.     Per Dr Calderon must be d/c on 1/12/2020 Gauri with ccp working on it.

## 2020-01-12 VITALS
HEART RATE: 78 BPM | OXYGEN SATURATION: 91 % | BODY MASS INDEX: 28.34 KG/M2 | TEMPERATURE: 97.8 F | HEIGHT: 69 IN | DIASTOLIC BLOOD PRESSURE: 76 MMHG | WEIGHT: 191.36 LBS | SYSTOLIC BLOOD PRESSURE: 152 MMHG | RESPIRATION RATE: 18 BRPM

## 2020-01-12 LAB
ANION GAP SERPL CALCULATED.3IONS-SCNC: 10.3 MMOL/L (ref 5–15)
BACTERIA SPEC AEROBE CULT: ABNORMAL
BUN BLD-MCNC: 12 MG/DL (ref 8–23)
BUN/CREAT SERPL: 15.8 (ref 7–25)
CALCIUM SPEC-SCNC: 8.6 MG/DL (ref 8.2–9.6)
CHLORIDE SERPL-SCNC: 102 MMOL/L (ref 98–107)
CO2 SERPL-SCNC: 21.7 MMOL/L (ref 22–29)
CREAT BLD-MCNC: 0.76 MG/DL (ref 0.57–1)
GFR SERPL CREATININE-BSD FRML MDRD: 71 ML/MIN/1.73
GLUCOSE BLD-MCNC: 104 MG/DL (ref 65–99)
POTASSIUM BLD-SCNC: 3.8 MMOL/L (ref 3.5–5.2)
SODIUM BLD-SCNC: 134 MMOL/L (ref 136–145)
VIT B12 BLD-MCNC: 579 PG/ML (ref 211–946)

## 2020-01-12 PROCEDURE — 80048 BASIC METABOLIC PNL TOTAL CA: CPT | Performed by: INTERNAL MEDICINE

## 2020-01-12 PROCEDURE — 82607 VITAMIN B-12: CPT | Performed by: INTERNAL MEDICINE

## 2020-01-12 RX ORDER — AMLODIPINE BESYLATE 10 MG/1
10 TABLET ORAL
Qty: 30 TABLET | Refills: 0 | Status: SHIPPED | OUTPATIENT
Start: 2020-01-13

## 2020-01-12 RX ADMIN — SODIUM CHLORIDE, PRESERVATIVE FREE 10 ML: 5 INJECTION INTRAVENOUS at 09:06

## 2020-01-12 RX ADMIN — AMLODIPINE BESYLATE 10 MG: 10 TABLET ORAL at 09:06

## 2020-01-12 RX ADMIN — CETIRIZINE HYDROCHLORIDE 5 MG: 10 TABLET, FILM COATED ORAL at 09:06

## 2020-01-12 RX ADMIN — PAROXETINE HYDROCHLORIDE 20 MG: 20 TABLET, FILM COATED ORAL at 06:41

## 2020-01-12 RX ADMIN — DICLOFENAC SODIUM 4 G: 10 GEL TOPICAL at 09:06

## 2020-01-12 RX ADMIN — ASPIRIN 325 MG: 325 TABLET ORAL at 09:06

## 2020-01-12 RX ADMIN — ATORVASTATIN CALCIUM 80 MG: 80 TABLET, FILM COATED ORAL at 09:06

## 2020-01-12 NOTE — DISCHARGE SUMMARY
NAME: Karen Greer ADMIT: 2020   : 8/15/1929  PCP: Atiya Miller MD    MRN: [id number] LOS: 2 days   AGE/SEX: 89 y.o. female  ROOM: [facility]     Date of Admission:  2020  Date of Discharge:  2020    PCP: Atiya Miller MD    CHIEF COMPLAINT  Weakness - Generalized ([facility]FF REPORTS PT WITH RIGHT SIDED WEAKNESS AND RIGHT ARM DRIFT; PT WITH GENERALIZED WEAKNESS, NO C/O PAIN)      DISCHARGE DIAGNOSIS  Active Hospital Problems    Diagnosis  POA   • **Acute ischemic stroke  [I63.9]  Yes   • Weakness of right upper extremity [R29.898]  Yes   • Asymptomatic bacteriuria [R82.71]  Yes   • Hypertension [I10]  Yes   • Hyperlipidemia [E78.5]  Yes      Resolved Hospital Problems   No resolved problems to display.       SECONDARY DIAGNOSES  Past Medical History:   Diagnosis Date   • Depression    • Edema    • Hyperlipidemia    • Hypertension    • Other abnormalities of gait and mobility    • Personal history of urinary (tract) infection    • Repeated falls        CONSULTS   Neurology    HOSPITAL COURSE     89 y.o. who presents with Sx of left subcortical stroke most likely related to hypertensive small vessel disease.  She worked with physical occupational and speech therapy here.  She was seen by neurology.  Amlodipine was added to her blood pressure regimen as well as atorvastatin added.  Goal  blood pressure treatment to <130/80.  She will be discharged back to her facility at [facility].  She is on chronic Macrobid for previous urinary tract infections.  While here she had asymptomatic bacteria.  There is no indication for antibiotic treatment.  Overall I do not recommend the chronic Macrobid but will defer this to her outpatient physicians.      DIAGNOSTICS    CT Angiogram Neck [[id number]] Edilberto as Reviewed   Order Status: Completed Collected: 01/10/20 1316    Updated: 01/10/20 1632   Narrative:     CT ANGIOGRAM HEAD AND NECK      HISTORY: Right-sided weakness,  stroke.     COMPARISON: MRI brain 01/09/2020.     FINDINGS: Initially, a noncontrasted CT examination of the brain was  performed. The brain and ventricles are symmetrical. Confluent areas of  decreased attenuation involving the white matter of the cerebral  hemispheres are noted bilaterally, nonspecific, but suggesting extensive  small vessel ischemic disease. The area of infarction involving the  subcortical white matter and corona radiata in the left parietal region  noted on the MRI examination of 01/09/2020 is occult. No new area of  decreased attenuation to suggest acute infarction is identified.     A CT angiogram of the neck and head was performed. Multiplanar, as well  as 3-dimensional reconstructions were also generated.     The great vessels are arranged in a bovine configuration. There is 0%  stenosis involving the carotid bifurcations using acid criteria. The  proximal aspects of the anterior and middle cerebral arteries appear  unremarkable.     Both vertebral arteries were opacified. The left vertebral artery is  minimally larger than that of the right. The basilar artery and the  proximal aspects of the posterior cerebral arteries appear unremarkable.  A standard postcontrast CT examination of the brain showed no evidence  of abnormal enhancement.     Sagittal reconstructions demonstrate at least moderate loss of disc  height at C3-C4, and to a lesser extent C4-C5, C5-C6 and C6-C7.      Impression:     1. No evidence of acute infarction or hemorrhage. The acute infarct  noted on the MRI examination 01/09/2020 is occult.  2. There is mild atherosclerotic disease involving the carotid  bifurcations bilaterally with 0% stenosis using NASCET criteria. There  is no evidence of a proximal high-grade intracranial stenosis or  aneurysm.           Radiation dose reduction techniques were utilized, including automated  exposure control and exposure modulation based on body size.     This report was finalized  on 1/10/2020 4:29 PM by Dr. Tin Arndt M.D.      MRI Brain Without Contrast [[id number]] Edilberto as Reviewed   Order Status: Completed Collected: 01/09/20 2158    Updated: 01/10/20 0533   Narrative:     BRAIN MRI WITHOUT GADOLINIUM     HISTORY: rt weakness; R29.898-Other symptoms and signs involving the  musculoskeletal system; I10-Essential (primary) hypertension;  N39.0-Urinary tract infection, site not specified     COMPARISON: None     FINDINGS:  Multiplanar images of the head were obtained without the use  of intravenous contrast.     There is a small somewhat triangular-shaped focus of diffusion  restriction in the left periventricular white matter near the frontal  parietal junction which on image 19 measures approximately 14 x 6 mm and  is compatible with small acute infarct. There is no associated  hemorrhage or mass effect.     There is generalized atrophy. There are fairly advanced  chronic-appearing white matter changes present bilaterally. Ventricles  are mildly prominent in caliber, likely at the upper spectrum of normal  considering atrophy. There is no midline shift. There is no evidence of  extra axial mass or fluid collection. The major vascular flow voids are  patent. Chronic-appearing paranasal sinus disease noted, greatest in the  left maxillary. Globes and orbital soft tissues are unremarkable.         Impression:     1. Small acute infarct in the left periventricular white matter without  hemorrhage or mass effect.  2. Atrophy and extensive chronic-appearing white matter changes.  3. Borderline ventriculomegaly.  4. Paranasal sinus disease as discussed.         01/12/2020 0547 01/12/2020 0647 Basic Metabolic Panel [[id number]]    (Abnormal)   Blood    Final result Glucose 104High  mg/dL   BUN 12 mg/dL   Creatinine 0.76 mg/dL   Sodium 134Low  mmol/L   Potassium 3.8 mmol/L   Chloride 102 mmol/L   CO2 21.7Low  mmol/L   Calcium 8.6 mg/dL   eGFR Non African Am 71 mL/min/1.73   BUN/Creatinine Ratio  15.8    Anion Gap 10.3 mmol/L           01/12/2020 0547 01/12/2020 0718 Vitamin B12 [[id number]]    Blood    Final result Vitamin B-12 579 pg/mL        01/10/2020 0501 01/10/2020 0704 Hemoglobin A1c [[id number]]    (Abnormal)   Blood    Final result Hemoglobin A1C 6.00High  %           01/10/2020 0501 01/10/2020 0641 Lipid Panel [[id number]]    Blood    Final result Total Cholesterol 127 mg/dL   Triglycerides 74 mg/dL   HDL Cholesterol 57 mg/dL   LDL Cholesterol  55 mg/dL   VLDL Cholesterol 14.8 mg/dL   LDL/HDL Ratio 0.97            01/10/2020 0501 01/10/2020 0640 TSH [[id number]]   Blood    Final result TSH Baseline 1.360 uIU/mL        2D ECHO 1/9/2020  · Estimated EF = 63%.  · Left ventricular systolic function is normal.    PHYSICAL EXAM  Objective    Alert  nad  No resp distress  Soft, nt  elderly    CONDITION ON DISCHARGE  Stable.      DISCHARGE DISPOSITION   Rehab Facility or Unit (DC - External)      DISCHARGE MEDICATIONS       Your medication list      START taking these medications      Instructions Last Dose Given Next Dose Due   amLODIPine 10 MG tablet  Commonly known as:  NORVASC  Start taking on:  January 13, 2020      Take 1 tablet by mouth Daily.       aspirin 81 MG tablet      Take 1 tablet by mouth Daily.          CONTINUE taking these medications      Instructions Last Dose Given Next Dose Due   atorvastatin 10 MG tablet  Commonly known as:  LIPITOR      Take 10 mg by mouth Daily.       CLARITIN 10 MG tablet  Generic drug:  loratadine      Take 10 mg by mouth Daily.       diclofenac 1 % gel gel  Commonly known as:  VOLTAREN      Apply 4 g topically to the appropriate area as directed 4 (Four) Times a Day.       furosemide 40 MG tablet  Commonly known as:  LASIX      Take 40 mg by mouth Daily.       guaiFENesin 600 MG 12 hr tablet  Commonly known as:  MUCINEX      Take 1 tablet by mouth 2 (Two) Times a Day As Needed.       nitrofurantoin (macrocrystal-monohydrate) 100 MG capsule  Commonly known as:   MACROBID      Take 100 mg by mouth Every Night.       PARoxetine 20 MG tablet  Commonly known as:  PAXIL      Take 20 mg by mouth Every Morning.       TYLENOL 500 MG tablet  Generic drug:  acetaminophen      Take 500 mg by mouth Every 6 (Six) Hours As Needed for Mild Pain .          STOP taking these medications    naproxen sodium 220 MG tablet  Commonly known as:  ALEVE              Where to Get Your Medications      You can get these medications from any pharmacy    Bring a paper prescription for each of these medications  · amLODIPine 10 MG tablet  · aspirin 81 MG tablet          Future Appointments   Date Time Provider Department Center   4/10/2020 11:00 AM Pamela Chirinos APRN [facility] None     Additional Instructions for the Follow-ups that You Need to Schedule     Discharge Follow-up with PCP   As directed       Currently Documented PCP:    Atiya Miller MD    PCP Phone Number:    [phone]     Follow Up Details:  2 weeks         Discharge Follow-up with Specialty: Neurology 2 months   As directed      Specialty:  Neurology 2 months            Contact information for follow-up providers     Atiya Miller MD .    Specialty:  Internal Medicine  Why:  2 weeks  Contact information:  [address]  [address]  [facility] [address]  [phone]                   Contact information for after-discharge care     Destination     [facility] .    Service:  Assisted Living  Contact information:  [address]  [facility] [address] [phone]                             TEST  RESULTS PENDING AT DISCHARGE         Shamir Calderon MD  [facility]  01/12/20  12:12 PM      Time: greater than 32 minutes on discharge  It was a pleasure taking care of this patient while in the hospital.

## 2020-01-12 NOTE — PROGRESS NOTES
"Continued Stay Note  [facility]     Patient Name: Karen Greer  MRN: [id number]  Today's Date: 1/12/2020    Admit Date: 1/9/2020    Discharge Plan     Row Name 01/12/20 1339       Plan    Plan  Return to [facility] via [facility] WC Van in  borrowed from [facility]..............Lo STEVENSON     Patient/Family in Agreement with Plan  yes    Plan Comments  Patient discharged via yellow WC van in [facility] wheelchair. S/W Leticia/Celso who will make sure [facility] WC is at / desk for [facility] staff to pick-up tomorrow, RN taped a paper to  with \"[facility]\" written on it. Pt. dc'd to PC..........Lo STEVENSON     Final Discharge Disposition Code  01 - home or self-care    Final Note  Patient discharged to Personal Care at [facility] via [facility] Wheelchair Van with [facility] WC...............Lo STEVENSON      Expected Discharge Date and Time     Expected Discharge Date Expected Discharge Time    Jan 12, 2020             Gauri Gregory, GRAHAM    "

## 2020-01-12 NOTE — PLAN OF CARE
Problem: Patient Care Overview  Goal: Plan of Care Review  Outcome: Ongoing (interventions implemented as appropriate)  Flowsheets (Taken 1/12/2020 9117)  Progress: improving  Plan of Care Reviewed With: patient  Outcome Summary: Patient denies pain, reports a good nights sleep. Looking forward to returning to [place] this afternoon.

## 2020-01-13 PROCEDURE — 99283 EMERGENCY DEPT VISIT LOW MDM: CPT

## 2020-01-14 ENCOUNTER — HOSPITAL ENCOUNTER (EMERGENCY)
Facility: HOSPITAL | Age: 85
Discharge: HOME | End: 2020-01-14
Attending: EMERGENCY MEDICINE | Admitting: EMERGENCY MEDICINE

## 2020-01-14 ENCOUNTER — APPOINTMENT (OUTPATIENT)
Dept: GENERAL RADIOLOGY | Facility: HOSPITAL | Age: 85
End: 2020-01-14

## 2020-01-14 VITALS
BODY MASS INDEX: 30.89 KG/M2 | SYSTOLIC BLOOD PRESSURE: 130 MMHG | TEMPERATURE: 98.5 F | OXYGEN SATURATION: 94 % | HEIGHT: 66 IN | HEART RATE: 83 BPM | RESPIRATION RATE: 16 BRPM | DIASTOLIC BLOOD PRESSURE: 66 MMHG

## 2020-01-14 DIAGNOSIS — S81.811A LACERATION OF RIGHT LOWER EXTREMITY EXCLUDING THIGH, INITIAL ENCOUNTER: Primary | ICD-10-CM

## 2020-01-14 PROCEDURE — 90715 TDAP VACCINE 7 YRS/> IM: CPT | Performed by: EMERGENCY MEDICINE

## 2020-01-14 PROCEDURE — 25010000002 TDAP 5-2.5-18.5 LF-MCG/0.5 SUSPENSION: Performed by: EMERGENCY MEDICINE

## 2020-01-14 PROCEDURE — 73590 X-RAY EXAM OF LOWER LEG: CPT

## 2020-01-14 PROCEDURE — 90471 IMMUNIZATION ADMIN: CPT | Performed by: EMERGENCY MEDICINE

## 2020-01-14 RX ORDER — SULFAMETHOXAZOLE AND TRIMETHOPRIM 800; 160 MG/1; MG/1
1 TABLET ORAL 2 TIMES DAILY
Qty: 14 TABLET | Refills: 0 | Status: SHIPPED | OUTPATIENT
Start: 2020-01-14

## 2020-01-14 RX ORDER — LIDOCAINE HYDROCHLORIDE AND EPINEPHRINE 10; 10 MG/ML; UG/ML
10 INJECTION, SOLUTION INFILTRATION; PERINEURAL ONCE
Status: COMPLETED | OUTPATIENT
Start: 2020-01-14 | End: 2020-01-14

## 2020-01-14 RX ADMIN — LIDOCAINE HYDROCHLORIDE,EPINEPHRINE BITARTRATE 10 ML: 10; .01 INJECTION, SOLUTION INFILTRATION; PERINEURAL at 00:43

## 2020-01-14 RX ADMIN — TETANUS TOXOID, REDUCED DIPHTHERIA TOXOID AND ACELLULAR PERTUSSIS VACCINE, ADSORBED 0.5 ML: 5; 2.5; 8; 8; 2.5 SUSPENSION INTRAMUSCULAR at 02:29

## 2020-01-14 NOTE — ED NOTES
Pt here with fall from nursing home via EMS. Pt just released from [facility] for stroke with right sided weakness, Pt was reaching for candy from [employer] and fell with right knee pain with laceration that was bandaged prior to EMS  Arrival. Bleeding controlled at this time. Denies hitting head with fall     Siomara Hennessy RN  01/13/20 3059    
vomiting and

## 2020-01-14 NOTE — ED PROVIDER NOTES
EMERGENCY DEPARTMENT ENCOUNTER    Room Number:  32/32  Date of encounter:  1/14/2020  PCP: Atiya Miller MD  Historian: Patient      HPI:  Chief Complaint: Fall with leg injury  A complete HPI/ROS/PMH/PSH/SH/FH are unobtainable due to: Nothing    Context: Karen Greer is a 89 y.o. female who presents to the ED c/o accidental fall out of her wheelchair this evening while she was reaching into the refrigerator to get something.  Patient said she did not hit her head, but she did hit her right leg and had some abrasions and lacerations for which she was sent for evaluation.    Review the old medical records in [facility], the patient was just discharged from the hospital yesterday after a stroke and was at a rehab facility today.      PAST MEDICAL HISTORY  Active Ambulatory Problems     Diagnosis Date Noted   • Weakness of right upper extremity 01/09/2020   • Asymptomatic bacteriuria 01/09/2020   • Hypertension 01/09/2020   • Hyperlipidemia 01/09/2020   • Acute ischemic stroke  01/09/2020     Resolved Ambulatory Problems     Diagnosis Date Noted   • No Resolved Ambulatory Problems     Past Medical History:   Diagnosis Date   • Depression    • Edema    • Other abnormalities of gait and mobility    • Personal history of urinary (tract) infection    • Repeated falls          PAST SURGICAL HISTORY  Past Surgical History:   Procedure Laterality Date   • APPENDECTOMY     • TONSILLECTOMY           FAMILY HISTORY  No family history on file.      SOCIAL HISTORY  Social History     Socioeconomic History   • Marital status: Single     Spouse name: Not on file   • Number of children: Not on file   • Years of education: Not on file   • Highest education level: Not on file   Tobacco Use   • Smoking status: Former Smoker   • Smokeless tobacco: Never Used   Substance and Sexual Activity   • Alcohol use: Yes     Comment: occ   • Drug use: Never         ALLERGIES  Patient has no known allergies.        REVIEW OF SYSTEMS  Review  of Systems     All systems reviewed and negative except for those discussed in HPI.       PHYSICAL EXAM    I have reviewed the triage vital signs and nursing notes.    ED Triage Vitals   Temp Heart Rate Resp BP SpO2   01/13/20 2155 01/13/20 2155 01/13/20 2155 01/13/20 2155 01/13/20 2155   98.5 °F (36.9 °C) 90 18 178/72 93 %      Temp src Heart Rate Source Patient Position BP Location FiO2 (%)   -- 01/13/20 2155 01/14/20 0219 01/14/20 0219 --    Monitor Sitting Right arm        Physical Exam  GENERAL: not distressed, awake and alert and very pleasant  HENT: nares patent, NCAT  EYES: no scleral icterus  CV: regular rhythm, regular rate  RESPIRATORY: normal effort  ABDOMEN: soft  MUSCULOSKELETAL: no deformity.  There is a superficial skin tear on the anterior right knee that has a Steri-Strip that was applied at the rehab facility.  On the anterior shin just lateral to the tibia there is a complex skin tear with a deeper soft tissue injury.  There is no significant bleeding.  There is no significant swelling or tenderness in the bony areas to suggest fracture  NEURO: There is no new focal deficit on exam, she is wheelchair-bound and has severe weakness in her legs.  SKIN: warm, dry, injuries described as above        LAB RESULTS  No results found for this or any previous visit (from the past 24 hour(s)).    Ordered the above labs and independently reviewed the results.        RADIOLOGY  Xr Tibia Fibula 2 View Right    Result Date: 1/14/2020  TWO-VIEW RIGHT TIBIA-FIBULA  HISTORY: fall with laceration  FINDINGS: 2 views of the right fibula tibia were submitted. There is no fracture or dislocation. Mild to moderate arthritic changes about the knee. Minimal pretibial soft tissue swelling and air present at the level of the more proximal diaphysis with overlying bandaging. No radiopaque foreign body. There is generalized soft tissue swelling diffusely about the ankle. There are vascular calculi.        1. No acute osseous  abnormality.         I ordered the above noted radiological studies. Reviewed by me and discussed with radiologist.  See dictation for official radiology interpretation.      PROCEDURES    Laceration Repair  Date/Time: 1/14/2020 3:48 AM  Performed by: Jimbo Levy MD  Authorized by: Jimbo Levy MD     Consent:     Consent obtained:  Verbal    Consent given by:  Patient    Risks discussed:  Infection, retained foreign body, poor cosmetic result and poor wound healing    Alternatives discussed:  No treatment  Anesthesia (see MAR for exact dosages):     Anesthesia method:  Local infiltration    Local anesthetic:  Lidocaine 1% WITH epi  Laceration details:     Location:  Leg    Leg location:  R lower leg    Wound length (cm): 4 cm total.    Laceration depth: Subcutaneous tissue and fascia are exposed.  Repair type:     Repair type:  Complex  Pre-procedure details:     Preparation:  Patient was prepped and draped in usual sterile fashion  Exploration:     Limited defect created (wound extended): yes      Hemostasis achieved with:  Epinephrine    Wound exploration: wound explored through full range of motion      Wound extent: areolar tissue violated and fascia violated      Wound extent: no nerve damage noted, no tendon damage noted, no underlying fracture noted and no vascular damage noted      Contaminated: no    Treatment:     Area cleansed with:  Betadine and saline    Amount of cleaning:  Standard    Irrigation solution:  Sterile saline    Irrigation method:  Syringe    Visualized foreign bodies/material removed: no      Debridement:  Moderate    Undermining:  Extensive    Scar revision: no    Skin repair:     Repair method:  Sutures    Suture size:  3-0    Suture material:  Fast-absorbing gut    Suture technique:  Simple interrupted    Number of sutures: A total of 3 subcutaneous sutures were placed as well as 6 intradermal Prolene sutures.  Approximation:     Approximation:  Close  Post-procedure details:      Dressing:  Antibiotic ointment and adhesive bandage  Comments:      On this wound there was an overlying epidermal flap which I was able to pull back and expose the complex subcutaneous wound.  I repaired that wound as described above with 2 layer closure, then reapplied as much of the epidermal viable tissue as I could with tissue adhesive prior to placing the dressing.          MEDICATIONS GIVEN IN ER    Medications   lidocaine-EPINEPHrine (XYLOCAINE W/EPI) 1 %-1:363588 injection 10 mL (10 mL Injection Given by Other 1/14/20 0043)   Tdap (BOOSTRIX) injection 0.5 mL (0.5 mL Intramuscular Given 1/14/20 0229)         PROGRESS, DATA ANALYSIS, CONSULTS, AND MEDICAL DECISION MAKING    All labs have been independently reviewed by me.  All radiology studies have been reviewed by me and discussed with radiologist dictating the report.   EKG's independently viewed and interpreted by me.  Discussion below represents my analysis of pertinent findings related to patient's condition, differential diagnosis, treatment plan and final disposition.        ED Course as of Jan 14 0351 Tue Jan 14, 2020   0348 Pain film of the right leg shows no fracture    [DP]      ED Course User Index  [DP] Jimbo Levy MD           AS OF 3:51 AM VITALS:    BP - 130/66  HR - 83  TEMP - 98.5 °F (36.9 °C)  O2 SATS - 94%        DIAGNOSIS  Final diagnoses:   Laceration of right lower extremity excluding thigh, initial encounter         DISPOSITION  Discharge           Jimbo Levy MD  01/14/20 0351

## 2021-03-02 DIAGNOSIS — Z23 IMMUNIZATION DUE: ICD-10-CM
